# Patient Record
Sex: FEMALE | Employment: STUDENT | URBAN - METROPOLITAN AREA
[De-identification: names, ages, dates, MRNs, and addresses within clinical notes are randomized per-mention and may not be internally consistent; named-entity substitution may affect disease eponyms.]

---

## 2017-01-19 ENCOUNTER — OFFICE VISIT (OUTPATIENT)
Dept: AUDIOLOGY | Facility: CLINIC | Age: 22
End: 2017-01-19
Payer: COMMERCIAL

## 2017-01-19 ENCOUNTER — OFFICE VISIT (OUTPATIENT)
Dept: OTOLARYNGOLOGY | Facility: CLINIC | Age: 22
End: 2017-01-19
Payer: COMMERCIAL

## 2017-01-19 VITALS — HEART RATE: 96 BPM | BODY MASS INDEX: 39.3 KG/M2 | OXYGEN SATURATION: 99 % | WEIGHT: 246 LBS

## 2017-01-19 DIAGNOSIS — H90.3 ASYMMETRICAL SENSORINEURAL HEARING LOSS: ICD-10-CM

## 2017-01-19 DIAGNOSIS — H93.12 TINNITUS, LEFT: ICD-10-CM

## 2017-01-19 DIAGNOSIS — H93.A2 PULSATILE TINNITUS OF LEFT EAR: ICD-10-CM

## 2017-01-19 DIAGNOSIS — H93.8X2 SENSATION OF FULLNESS IN LEFT EAR: ICD-10-CM

## 2017-01-19 DIAGNOSIS — H90.12 CONDUCTIVE HEARING LOSS IN LEFT EAR: Primary | ICD-10-CM

## 2017-01-19 PROCEDURE — 99207 ZZC NO CHARGE LOS: CPT | Performed by: AUDIOLOGIST

## 2017-01-19 PROCEDURE — 99203 OFFICE O/P NEW LOW 30 MIN: CPT | Performed by: OTOLARYNGOLOGY

## 2017-01-19 PROCEDURE — 92557 COMPREHENSIVE HEARING TEST: CPT | Performed by: AUDIOLOGIST

## 2017-01-19 PROCEDURE — 92567 TYMPANOMETRY: CPT | Performed by: AUDIOLOGIST

## 2017-01-19 NOTE — PROGRESS NOTES
SUBJECTIVE:                                                    Dominique Shah is a 21 year old female who presents to clinic today for the following health issues:      Feeling of rushing sound in left ear x5 months.     It is a pulsatile sound c/w heart beat and she feels that hearing is down in the ear.  No vertigo.    Problem list and histories reviewed & adjusted, as indicated.  Additional history: as documented    Patient Active Problem List   Diagnosis     Allergic rhinitis     Dysmenorrhea     Menorrhagia     Abdominal pain, right upper quadrant     CARDIOVASCULAR SCREENING; LDL GOAL LESS THAN 160     Tinea versicolor     ASCUS with positive high risk HPV     Depression with anxiety     Major depression in complete remission (H)     Past Surgical History   Procedure Laterality Date     Esophagoscopy, gastroscopy, duodenoscopy (egd), combined  5/9/2013     Procedure: COMBINED ESOPHAGOSCOPY, GASTROSCOPY, DUODENOSCOPY (EGD), BIOPSY SINGLE OR MULTIPLE;  esophagoscopy, gastroscopy, duodenoscopy EGD  with  multiple biopsies;  Surgeon: Lawrence Friedman MD;  [facility]     Laparoscopic cholecystectomy  5/23/2013     Procedure: LAPAROSCOPIC CHOLECYSTECTOMY;  Laparoscopic Cholecystectomy;  Surgeon: Lawrence Friedman MD;  [facility]     Arthroscopy shoulder capsular repair  9/25/2013     Procedure: ARTHROSCOPY SHOULDER CAPSULAR REPAIR;  Right Shoulder Arthroscopy with  labral repair,   ;  Surgeon: Jay Jay Avila MD;  [facility]     Transposition ulnar nerve (elbow)  7/30/2014     Procedure: TRANSPOSITION ULNAR NERVE (ELBOW);  Surgeon: Jay Jay Avila MD;  [facility]       Social History   Substance Use Topics     Smoking status: Never Smoker      Smokeless tobacco: Never Used     Alcohol Use: Yes      Comment: pretty rare     Family History   Problem Relation Age of Onset     DIABETES Maternal Grandfather      AOD     HEART DISEASE Maternal Grandfather      MI      Obesity Maternal Grandfather      Unknown/Adopted No family hx of      Asthma No family hx of      Hypertension No family hx of      CEREBROVASCULAR DISEASE No family hx of      Breast Cancer No family hx of      Cancer - colorectal No family hx of      Prostate Cancer No family hx of      Alcohol/Drug No family hx of      Allergies No family hx of      Alzheimer Disease No family hx of      Anesthesia Reaction No family hx of      Arthritis No family hx of      Blood Disease No family hx of      CANCER No family hx of      Cardiovascular No family hx of      Circulatory No family hx of      Congenital Anomalies No family hx of      Connective Tissue Disorder No family hx of      Depression No family hx of      Eye Disorder No family hx of      Genetic Disorder No family hx of      GASTROINTESTINAL DISEASE No family hx of      Genitourinary Problems No family hx of      Gynecology No family hx of      Lipids No family hx of      Musculoskeletal Disorder No family hx of      Neurologic Disorder No family hx of      OSTEOPOROSIS No family hx of      Psychotic Disorder No family hx of      Respiratory No family hx of      Thyroid Disease No family hx of      Hearing Loss No family hx of          Current Outpatient Prescriptions   Medication Sig Dispense Refill     levonorgestrel-ethinyl estradiol (AVIANE) 0.1-20 MG-MCG per tablet Take 1 tablet by mouth daily 84 tablet 3     cyclobenzaprine (FLEXERIL) 10 MG tablet Take 1 tablet (10 mg) by mouth 3 times daily as needed for muscle spasms 30 tablet 1     ketoconazole (NIZORAL) 200 MG tablet TAKE ONE TABLET BY MOUTH EVERY DAY FOR 1 WEEK THEN TAKE 1 TABLET EITHER ONCE A WEEK OR ONCE A MONTH AS NEEDED 10 tablet 2     FLUoxetine (PROZAC) 10 MG capsule Take 2 capsules (20 mg) by mouth daily 60 capsule 5     EQ LORATADINE 10 MG tablet TAKE ONE TABLET BY MOUTH ONCE DAILY 90 tablet 0     IBUPROFEN PO Take 200 mg by mouth every 6 hours       Allergies   Allergen Reactions      Oxycodone Nausea and Vomiting     Vicodone has been going well.     Seasonal Allergies      Sulfa Drugs Rash     Problem list, Medication list, Allergies, and Medical/Social/Surgical histories reviewed in [facility] and updated as appropriate.    ROS:  Constitutional, HEENT, cardiovascular, pulmonary, gi and gu systems are negative, except as otherwise noted.    OBJECTIVE:                                                    Pulse 96  Wt 111.585 kg (246 lb)  SpO2 99%  Body mass index is 39.3 kg/(m^2).  GENERAL: healthy, alert and no distress  EYES: Eyes grossly normal to inspection, PERRL and conjunctivae and sclerae normal  HENT: ear canals and TM's normal, nose and mouth without ulcers or lesions  NECK: no adenopathy, no asymmetry, masses, or scars and thyroid normal to palpation  NEURO: Normal strength and tone, mentation intact and speech normal    Diagnostic Test Results:  Audio - normal right ear, mild low frequency SNHL left ear, normal tymps     ASSESSMENT/PLAN:                                                    Pulsatile tinnitus. Further eval required. Will get MRI of Brain and MRA    Follow up in 2 weeks    Rafi Rosado MD, MD  [facility]

## 2017-01-19 NOTE — MR AVS SNAPSHOT
After Visit Summary   1/19/2017    Dominique Shah    MRN: [id number]           Patient Information     Date Of Birth          1995        Visit Information        Provider Department      1/19/2017 11:00 AM Rafi Rosado MD [facility]        Today's Diagnoses     Sensation of plugged ear    -  1     Pulsatile tinnitus of left ear         Asymmetrical sensorineural hearing loss            Follow-ups after your visit        Additional Services     AUDIOLOGY ADULT REFERRAL                 Your next 10 appointments already scheduled     Feb 02, 2017 11:15 AM   Return Visit with Rafi Rosado MD   [facility] ([facility]    [address] [place] [id number]   [phone]              Future tests that were ordered for you today     Open Future Orders        Priority Expected Expires Ordered    MR Brain w/o & w Contrast Routine  1/19/2018 1/19/2017    MRA Head w/o Contrast Angiogram Routine  1/19/2018 1/19/2017            Who to contact     If you have questions or need follow up information about today's clinic visit or your schedule please contact [facility] directly at [phone].  Normal or non-critical lab and imaging results will be communicated to you by [employer], letter or phone within 4 business days after the clinic has received the results. If you do not hear from us within 7 days, please contact the clinic through [employer] or phone. If you have a critical or abnormal lab result, we will notify you by phone as soon as possible.  Submit refill requests through [employer] or call your pharmacy and they will forward the refill request to us. Please allow 3 business days for your refill to be completed.          Additional Information About Your Visit        MyChart Information     [employer] gives you secure access to your electronic health record. If you see a primary care provider, you can also send messages to  your care team and make appointments. If you have questions, please call your primary care clinic.  If you do not have a primary care provider, please call [phone] and they will assist you.        Care EveryWhere ID     This is your Care EveryWhere ID. This could be used by other organizations to access your [facility] medical records  [id number]        Your Vitals Were     Pulse Pulse Oximetry                96 99%           Blood Pressure from Last 3 Encounters:   12/15/16 122/66   11/02/16 126/66   12/10/15 110/68    Weight from Last 3 Encounters:   01/19/17 111.585 kg (246 lb)   12/15/16 110.224 kg (243 lb)   11/02/16 108.41 kg (239 lb)              We Performed the Following     AUDIOLOGY ADULT REFERRAL        Primary Care Provider Office Phone # Fax #    Fide Scruggs PA-C [phone]       [facility] [address] [place] [address]        Thank you!     Thank you for choosing [facility]  for your care. Our goal is always to provide you with excellent care. Hearing back from our patients is one way we can continue to improve our services. Please take a few minutes to complete the written survey that you may receive in the mail after your visit with us. Thank you!             Your Updated Medication List - Protect others around you: Learn how to safely use, store and throw away your medicines at www.disposemymeds.org.          This list is accurate as of: 1/19/17 12:42 PM.  Always use your most recent med list.                   Brand Name Dispense Instructions for use    cyclobenzaprine 10 MG tablet    FLEXERIL    30 tablet    Take 1 tablet (10 mg) by mouth 3 times daily as needed for muscle spasms       EQ LORATADINE 10 MG tablet   Generic drug:  loratadine     90 tablet    TAKE ONE TABLET BY MOUTH ONCE DAILY       FLUoxetine 10 MG capsule    PROzac    60 capsule    Take 2 capsules (20 mg) by mouth daily       IBUPROFEN PO      Take 200 mg by mouth  every 6 hours       ketoconazole 200 MG tablet    NIZORAL    10 tablet    TAKE ONE TABLET BY MOUTH EVERY DAY FOR 1 WEEK THEN TAKE 1 TABLET EITHER ONCE A WEEK OR ONCE A MONTH AS NEEDED       levonorgestrel-ethinyl estradiol 0.1-20 MG-MCG per tablet    AVIANE    84 tablet    Take 1 tablet by mouth daily

## 2017-01-19 NOTE — NURSING NOTE
"Chief Complaint   Patient presents with     Consult     referring Fide Scruggs PA-C     Ent Problem     Sensation of plugged ears.       Initial Pulse 96  Wt 111.585 kg (246 lb)  SpO2 99% Estimated body mass index is 39.3 kg/(m^2) as calculated from the following:    Height as of 11/2/16: 1.685 m (5' 6.34\").    Weight as of this encounter: 111.585 kg (246 lb).  BP completed using cuff size: NA (Not Taken)  "

## 2017-01-19 NOTE — PROGRESS NOTES
AUDIOLOGY REPORT    SUMMARY: Audiology visit completed. See audiogram for results.      RECOMMENDATIONS: Follow-up with ENT.    Rena Urbano  Audiologist  [place] License  #[id number]

## 2017-01-25 ENCOUNTER — HOSPITAL ENCOUNTER (OUTPATIENT)
Dept: MRI IMAGING | Facility: CLINIC | Age: 22
End: 2017-01-25
Attending: OTOLARYNGOLOGY
Payer: COMMERCIAL

## 2017-01-25 ENCOUNTER — HOSPITAL ENCOUNTER (OUTPATIENT)
Dept: MRI IMAGING | Facility: CLINIC | Age: 22
Discharge: HOME | End: 2017-01-25
Attending: OTOLARYNGOLOGY | Admitting: OTOLARYNGOLOGY
Payer: COMMERCIAL

## 2017-01-25 DIAGNOSIS — H90.3 ASYMMETRICAL SENSORINEURAL HEARING LOSS: ICD-10-CM

## 2017-01-25 DIAGNOSIS — H93.A2 PULSATILE TINNITUS OF LEFT EAR: ICD-10-CM

## 2017-01-25 PROCEDURE — A9585 GADOBUTROL INJECTION: HCPCS | Performed by: RADIOLOGY

## 2017-01-25 PROCEDURE — 25500064 ZZH RX 255 OP 636: Performed by: RADIOLOGY

## 2017-01-25 PROCEDURE — 70544 MR ANGIOGRAPHY HEAD W/O DYE: CPT

## 2017-01-25 PROCEDURE — 70553 MRI BRAIN STEM W/O & W/DYE: CPT

## 2017-01-25 RX ORDER — GADOBUTROL 604.72 MG/ML
10 INJECTION INTRAVENOUS ONCE
Status: COMPLETED | OUTPATIENT
Start: 2017-01-25 | End: 2017-01-25

## 2017-01-25 RX ADMIN — GADOBUTROL 10 ML: 604.72 INJECTION INTRAVENOUS at 15:49

## 2017-01-31 DIAGNOSIS — H93.A1 TINNITUS OF RIGHT EAR OF VASCULAR ORIGIN: Primary | ICD-10-CM

## 2017-01-31 RX ORDER — ALPRAZOLAM 0.25 MG
0.25 TABLET ORAL
Qty: 30 TABLET | Refills: 3 | Status: CANCELLED | OUTPATIENT
Start: 2017-01-31

## 2017-01-31 RX ORDER — ALPRAZOLAM 0.25 MG
0.25 TABLET ORAL
Qty: 30 TABLET | Refills: 3 | Status: SHIPPED | OUTPATIENT
Start: 2017-01-31 | End: 2017-03-02

## 2017-04-17 ENCOUNTER — MYC MEDICAL ADVICE (OUTPATIENT)
Dept: FAMILY MEDICINE | Facility: OTHER | Age: 22
End: 2017-04-17

## 2017-04-17 DIAGNOSIS — F32.5 MAJOR DEPRESSION IN COMPLETE REMISSION (H): ICD-10-CM

## 2017-04-18 RX ORDER — FLUOXETINE 10 MG/1
30 CAPSULE ORAL DAILY
Qty: 90 CAPSULE | Refills: 1 | Status: SHIPPED | OUTPATIENT
Start: 2017-04-18 | End: 2017-06-26

## 2017-04-20 NOTE — PROGRESS NOTES
SUBJECTIVE:     CC: Dominique Shah is an 21 year old woman who presents for preventive health visit.     Physical   Annual:     Getting at least 3 servings of Calcium per day::  Yes    Bi-annual eye exam::  Yes    Dental care twice a year::  Yes    Sleep apnea or symptoms of sleep apnea::  None    Diet::  Regular (no restrictions)    Frequency of exercise::  2-3 days/week    Duration of exercise::  15-30 minutes    Taking medications regularly::  Yes    Medication side effects::  None    Additional concerns today::  YES    Answers for HPI/ROS submitted by the patient on 4/21/2017   If you checked off any problems, how difficult have these problems made it for you to do your work, take care of things at home, or get along with other people?: Somewhat difficult  PHQ9 TOTAL SCORE: 6  JAME 7 TOTAL SCORE: 1  Q1: Little interest or pleasure in doing things: 1=Several days  Q2: Feeling down, depressed or hopeless: 1=Several days  PHQ-2 Score: 2    Patient needs titers for school- Hep B, Meningococcal, MMR, Tdap.  Patient would also like her Thyroid Checked today.    Today's PHQ-2 Score:   PHQ-2 ( 1999 [employer]) 12/15/2016   Q1: Little interest or pleasure in doing things 1   Q2: Feeling down, depressed or hopeless 1   PHQ-2 Score 2       Abuse: Current or Past(Physical, Sexual or Emotional)- No  Do you feel safe in your environment - Yes    Social History   Substance Use Topics     Smoking status: Never Smoker     Smokeless tobacco: Never Used     Alcohol use Yes      Comment: pretty rare     The patient does not drink >3 drinks per day nor >7 drinks per week.    Recent Labs   Lab Test  05/28/15   1202   CHOL  158   HDL  59   LDL  83   TRIG  78   CHOLHDLRATIO  2.7       Reviewed orders with patient.  Reviewed health maintenance and updated orders accordingly - Yes    Mammo Decision Support:  Mammogram not appropriate for this patient based on age.    Pertinent mammograms are reviewed under the imaging tab.  History of  abnormal Pap smear: NO - age 21-29 PAP every 3 years recommended    Reviewed and updated as needed this visit by clinical staff         Reviewed and updated as needed this visit by Provider          Past Medical History:   Diagnosis Date     ASCUS with positive high risk HPV 5/28/15    @ 19 y.o.     Infectious mononucleosis     Age 7     Rubella without mention of complication     Rubella      Past Surgical History:   Procedure Laterality Date     ARTHROSCOPY SHOULDER CAPSULAR REPAIR  9/25/2013    Procedure: ARTHROSCOPY SHOULDER CAPSULAR REPAIR;  Right Shoulder Arthroscopy with  labral repair,   ;  Surgeon: Jay Jay Avila MD;  [facility]     ESOPHAGOSCOPY, GASTROSCOPY, DUODENOSCOPY (EGD), COMBINED  5/9/2013    Procedure: COMBINED ESOPHAGOSCOPY, GASTROSCOPY, DUODENOSCOPY (EGD), BIOPSY SINGLE OR MULTIPLE;  esophagoscopy, gastroscopy, duodenoscopy EGD  with  multiple biopsies;  Surgeon: Lawrence Friedman MD;  [facility]     LAPAROSCOPIC CHOLECYSTECTOMY  5/23/2013    Procedure: LAPAROSCOPIC CHOLECYSTECTOMY;  Laparoscopic Cholecystectomy;  Surgeon: Lawrence Friedman MD;  [facility]     TRANSPOSITION ULNAR NERVE (ELBOW)  7/30/2014    Procedure: TRANSPOSITION ULNAR NERVE (ELBOW);  Surgeon: Jay Jay Avila MD;  [facility]       ROS:  C: NEGATIVE for fever, chills, change in weight  I: NEGATIVE for worrisome rashes, moles or lesions  E: NEGATIVE for vision changes or irritation  ENT: NEGATIVE for ear, mouth and throat problems  R: NEGATIVE for significant cough or SOB  B: NEGATIVE for masses, tenderness or discharge  CV: NEGATIVE for chest pain, palpitations or peripheral edema  GI: NEGATIVE for nausea, abdominal pain, heartburn, or change in bowel habits  : NEGATIVE for unusual urinary or vaginal symptoms. Periods are regular.  M: NEGATIVE for significant arthralgias or myalgia  N: NEGATIVE for weakness, dizziness or paresthesias  P: NEGATIVE for changes in mood or  affect    Problem list, Medication list, Allergies, and Medical/Social/Surgical histories reviewed in [facility] and updated as appropriate.  Labs reviewed in [facility]  BP Readings from Last 3 Encounters:   04/24/17 118/70   12/15/16 122/66   11/02/16 126/66    Wt Readings from Last 3 Encounters:   04/24/17 245 lb (111.1 kg)   01/19/17 246 lb (111.6 kg)   12/15/16 243 lb (110.2 kg)                  Patient Active Problem List   Diagnosis     Allergic rhinitis     Tinea versicolor     ASCUS with positive high risk HPV     Mild episode of recurrent major depressive disorder (H)     Encounter for surveillance of contraceptive pills     Past Surgical History:   Procedure Laterality Date     ARTHROSCOPY SHOULDER CAPSULAR REPAIR  9/25/2013    Procedure: ARTHROSCOPY SHOULDER CAPSULAR REPAIR;  Right Shoulder Arthroscopy with  labral repair,   ;  Surgeon: Jay Jay Avila MD;  [facility]     ESOPHAGOSCOPY, GASTROSCOPY, DUODENOSCOPY (EGD), COMBINED  5/9/2013    Procedure: COMBINED ESOPHAGOSCOPY, GASTROSCOPY, DUODENOSCOPY (EGD), BIOPSY SINGLE OR MULTIPLE;  esophagoscopy, gastroscopy, duodenoscopy EGD  with  multiple biopsies;  Surgeon: Lawrence Friedman MD;  [facility]     LAPAROSCOPIC CHOLECYSTECTOMY  5/23/2013    Procedure: LAPAROSCOPIC CHOLECYSTECTOMY;  Laparoscopic Cholecystectomy;  Surgeon: Lawrence Friedman MD;  [facility]     TRANSPOSITION ULNAR NERVE (ELBOW)  7/30/2014    Procedure: TRANSPOSITION ULNAR NERVE (ELBOW);  Surgeon: Jay Jay Avila MD;  [facility]       Social History   Substance Use Topics     Smoking status: Never Smoker     Smokeless tobacco: Never Used     Alcohol use Yes      Comment: pretty rare     Family History   Problem Relation Age of Onset     DIABETES Maternal Grandfather      AOD     HEART DISEASE Maternal Grandfather      MI     Obesity Maternal Grandfather      Unknown/Adopted No family hx of      Asthma No family hx of      Hypertension No family hx of       CEREBROVASCULAR DISEASE No family hx of      Breast Cancer No family hx of      Cancer - colorectal No family hx of      Prostate Cancer No family hx of      Alcohol/Drug No family hx of      Allergies No family hx of      Alzheimer Disease No family hx of      Anesthesia Reaction No family hx of      Arthritis No family hx of      Blood Disease No family hx of      CANCER No family hx of      Cardiovascular No family hx of      Circulatory No family hx of      Congenital Anomalies No family hx of      Connective Tissue Disorder No family hx of      Depression No family hx of      Eye Disorder No family hx of      Genetic Disorder No family hx of      GASTROINTESTINAL DISEASE No family hx of      Genitourinary Problems No family hx of      Gynecology No family hx of      Lipids No family hx of      Musculoskeletal Disorder No family hx of      Neurologic Disorder No family hx of      OSTEOPOROSIS No family hx of      Psychotic Disorder No family hx of      Respiratory No family hx of      Thyroid Disease No family hx of      Hearing Loss No family hx of          Current Outpatient Prescriptions   Medication Sig Dispense Refill     FLUoxetine (PROZAC) 10 MG capsule Take 3 capsules (30 mg) by mouth daily 90 capsule 1     levonorgestrel-ethinyl estradiol (AVIANE) 0.1-20 MG-MCG per tablet Take 1 tablet by mouth daily 84 tablet 3     ketoconazole (NIZORAL) 200 MG tablet TAKE ONE TABLET BY MOUTH EVERY DAY FOR 1 WEEK THEN TAKE 1 TABLET EITHER ONCE A WEEK OR ONCE A MONTH AS NEEDED 10 tablet 2     EQ LORATADINE 10 MG tablet TAKE ONE TABLET BY MOUTH ONCE DAILY 90 tablet 0     IBUPROFEN PO Take 200 mg by mouth every 6 hours Reported on 4/24/2017       Allergies   Allergen Reactions     Oxycodone Nausea and Vomiting     Vicodone has been going well.     Seasonal Allergies      Sulfa Drugs Rash     OBJECTIVE:     There were no vitals taken for this visit.  EXAM:  Physical Exam   Constitutional: She is oriented to person, place,  "and time. She appears well-developed and well-nourished.   HENT:   Head: Normocephalic and atraumatic.   Right Ear: External ear normal.   Left Ear: External ear normal.   Mouth/Throat: Oropharynx is clear and moist.   Eyes: EOM are normal.   Neck: Neck supple.   Cardiovascular: Normal rate and regular rhythm.    Pulmonary/Chest: Effort normal and breath sounds normal.   Abdominal: Soft. Bowel sounds are normal.   Musculoskeletal: Normal range of motion.   Neurological: She is alert and oriented to person, place, and time.   Psychiatric: She has a normal mood and affect.         ASSESSMENT/PLAN:       Problem List Items Addressed This Visit     Mild episode of recurrent major depressive disorder (H)     Slight worsening .  Recent increase in dose of prozac. Follow up per PCP         Encounter for surveillance of contraceptive pills     Has been on OCP since the age of 13. No side effects  Uptodate PAP. Can give refills as needed for the next yr         Non morbid obesity, unspecified obesity type    Relevant Orders    Glucose, whole blood    Lipid Profile with reflex to direct LDL    TSH with free T4 reflex      Other Visit Diagnoses     Encounter for routine adult health examination with abnormal findings    -  Primary    Relevant Orders    Varicella Zoster Virus Antibody IgG    Glucose, whole blood    Lipid Profile with reflex to direct LDL    Abnormal weight gain        Relevant Orders    TSH with free T4 reflex            COUNSELING:  Reviewed preventive health counseling, as reflected in patient instructions       Regular exercise       Healthy diet/nutrition       Vision screening       Hearing screening         reports that she has never smoked. She has never used smokeless tobacco.    Estimated body mass index is 39.3 kg/(m^2) as calculated from the following:    Height as of 11/2/16: 5' 6.34\" (1.685 m).    Weight as of 1/19/17: 246 lb (111.6 kg).   Weight management plan: Discussed healthy diet and exercise " guidelines and patient will follow up in 12 months in clinic to re-evaluate.    Counseling Resources:  ATP IV Guidelines  Pooled Cohorts Equation Calculator  Breast Cancer Risk Calculator  FRAX Risk Assessment  ICSI Preventive Guidelines  Dietary Guidelines for Americans, 2010  USDA's MyPlate  ASA Prophylaxis  Lung CA Screening    Christelle Salcedo MD  [facility]

## 2017-04-21 ASSESSMENT — PATIENT HEALTH QUESTIONNAIRE - PHQ9
10. IF YOU CHECKED OFF ANY PROBLEMS, HOW DIFFICULT HAVE THESE PROBLEMS MADE IT FOR YOU TO DO YOUR WORK, TAKE CARE OF THINGS AT HOME, OR GET ALONG WITH OTHER PEOPLE: SOMEWHAT DIFFICULT
SUM OF ALL RESPONSES TO PHQ QUESTIONS 1-9: 6

## 2017-04-21 ASSESSMENT — ANXIETY QUESTIONNAIRES
7. FEELING AFRAID AS IF SOMETHING AWFUL MIGHT HAPPEN: 0 = NOT AT ALL
GAD7 TOTAL SCORE: 1
GAD7 TOTAL SCORE: 1

## 2017-04-22 ASSESSMENT — ANXIETY QUESTIONNAIRES: GAD7 TOTAL SCORE: 1

## 2017-04-22 ASSESSMENT — PATIENT HEALTH QUESTIONNAIRE - PHQ9: SUM OF ALL RESPONSES TO PHQ QUESTIONS 1-9: 6

## 2017-04-24 ENCOUNTER — OFFICE VISIT (OUTPATIENT)
Dept: FAMILY MEDICINE | Facility: OTHER | Age: 22
End: 2017-04-24
Payer: COMMERCIAL

## 2017-04-24 VITALS
SYSTOLIC BLOOD PRESSURE: 118 MMHG | HEIGHT: 66 IN | DIASTOLIC BLOOD PRESSURE: 70 MMHG | WEIGHT: 245 LBS | OXYGEN SATURATION: 98 % | HEART RATE: 74 BPM | TEMPERATURE: 98.3 F | BODY MASS INDEX: 39.37 KG/M2 | RESPIRATION RATE: 16 BRPM

## 2017-04-24 DIAGNOSIS — Z00.01 ENCOUNTER FOR ROUTINE ADULT HEALTH EXAMINATION WITH ABNORMAL FINDINGS: Primary | ICD-10-CM

## 2017-04-24 DIAGNOSIS — Z30.41 ENCOUNTER FOR SURVEILLANCE OF CONTRACEPTIVE PILLS: ICD-10-CM

## 2017-04-24 DIAGNOSIS — F33.0 MILD EPISODE OF RECURRENT MAJOR DEPRESSIVE DISORDER (H): ICD-10-CM

## 2017-04-24 DIAGNOSIS — E66.9 NON MORBID OBESITY, UNSPECIFIED OBESITY TYPE: ICD-10-CM

## 2017-04-24 DIAGNOSIS — R63.5 ABNORMAL WEIGHT GAIN: ICD-10-CM

## 2017-04-24 PROCEDURE — 86787 VARICELLA-ZOSTER ANTIBODY: CPT | Performed by: FAMILY MEDICINE

## 2017-04-24 PROCEDURE — 99395 PREV VISIT EST AGE 18-39: CPT | Performed by: FAMILY MEDICINE

## 2017-04-24 PROCEDURE — 36415 COLL VENOUS BLD VENIPUNCTURE: CPT | Performed by: FAMILY MEDICINE

## 2017-04-24 ASSESSMENT — PAIN SCALES - GENERAL: PAINLEVEL: NO PAIN (0)

## 2017-04-24 NOTE — MR AVS SNAPSHOT
After Visit Summary   4/24/2017    Dominique Shah    MRN: [id number]           Patient Information     Date Of Birth          1995        Visit Information        Provider Department      4/24/2017 11:00 AM Christelle Salcedo MD [facility]        Today's Diagnoses     Encounter for routine adult health examination with abnormal findings    -  1    Mild episode of recurrent major depressive disorder (H)        Encounter for surveillance of contraceptive pills        Non morbid obesity, unspecified obesity type        Abnormal weight gain          Care Instructions      Preventive Health Recommendations  Female Ages 18 to 25     Yearly exam:     See your health care provider every year in order to  o Review health changes.   o Discuss preventive care.    o Review your medicines if your doctor has prescribed any.      You should be tested each year for STDs (sexually transmitted diseases).       After age 20, talk to your provider about how often you should have cholesterol testing.      Starting at age 21, get a Pap test every three years. If you have an abnormal result, your doctor may have you test more often.      If you are at risk for diabetes, you should have a diabetes test (fasting glucose).     Shots:     Get a flu shot each year.     Get a tetanus shot every 10 years.     Consider getting the shot (vaccine) that prevents cervical cancer (Gardasil).    Nutrition:     Eat at least 5 servings of fruits and vegetables each day.    Eat whole-grain bread, whole-wheat pasta and brown rice instead of white grains and rice.    Talk to your provider about Calcium and Vitamin D.     Lifestyle    Exercise at least 150 minutes a week each week (30 minutes a day, 5 days a week). This will help you control your weight and prevent disease.    Limit alcohol to one drink per day.    No smoking.     Wear sunscreen to prevent skin cancer.    See your dentist every six months for an exam and  "cleaning.        Follow-ups after your visit        Follow-up notes from your care team     Return in about 1 year (around 4/24/2018).      Future tests that were ordered for you today     Open Future Orders        Priority Expected Expires Ordered    TSH with free T4 reflex Routine  4/24/2018 4/24/2017    Glucose, whole blood Routine  4/24/2018 4/24/2017    Lipid Profile with reflex to direct LDL Routine  4/24/2018 4/24/2017            Who to contact     If you have questions or need follow up information about today's clinic visit or your schedule please contact [facility] directly at [phone].  Normal or non-critical lab and imaging results will be communicated to you by MyChart, letter or phone within 4 business days after the clinic has received the results. If you do not hear from us within 7 days, please contact the clinic through Perfect Memory[employer] or phone. If you have a critical or abnormal lab result, we will notify you by phone as soon as possible.  Submit refill requests through Users[employer] or call your pharmacy and they will forward the refill request to us. Please allow 3 business days for your refill to be completed.          Additional Information About Your Visit        MyChart Information     Users[employer] gives you secure access to your electronic health record. If you see a primary care provider, you can also send messages to your care team and make appointments. If you have questions, please call your primary care clinic.  If you do not have a primary care provider, please call [phone] and they will assist you.        Care EveryWhere ID     This is your Care EveryWhere ID. This could be used by other organizations to access your [place] medical records  [id number]        Your Vitals Were     Pulse Temperature Respirations Height Pulse Oximetry BMI (Body Mass Index)    74 98.3  F (36.8  C) (Oral) 16 5' 6.46\" (1.688 m) 98% 39 kg/m2       Blood Pressure from Last 3 Encounters:   04/24/17 " 118/70   12/15/16 122/66   11/02/16 126/66    Weight from Last 3 Encounters:   04/24/17 245 lb (111.1 kg)   01/19/17 246 lb (111.6 kg)   12/15/16 243 lb (110.2 kg)              We Performed the Following     Varicella Zoster Virus Antibody IgG          Today's Medication Changes          These changes are accurate as of: 4/24/17 11:56 AM.  If you have any questions, ask your nurse or doctor.               Stop taking these medicines if you haven't already. Please contact your care team if you have questions.     cyclobenzaprine 10 MG tablet   Commonly known as:  FLEXERIL   Stopped by:  Christelle Salcedo MD                    Primary Care Provider Office Phone # Fax #    Fide Scruggs PA-C [phone]       [facility] [address] [place] [address]        Thank you!     Thank you for choosing [facility]  for your care. Our goal is always to provide you with excellent care. Hearing back from our patients is one way we can continue to improve our services. Please take a few minutes to complete the written survey that you may receive in the mail after your visit with us. Thank you!             Your Updated Medication List - Protect others around you: Learn how to safely use, store and throw away your medicines at www.disposemymeds.org.          This list is accurate as of: 4/24/17 11:56 AM.  Always use your most recent med list.                   Brand Name Dispense Instructions for use    EQ LORATADINE 10 MG tablet   Generic drug:  loratadine     90 tablet    TAKE ONE TABLET BY MOUTH ONCE DAILY       FLUoxetine 10 MG capsule    PROzac    90 capsule    Take 3 capsules (30 mg) by mouth daily       IBUPROFEN PO      Take 200 mg by mouth every 6 hours Reported on 4/24/2017       ketoconazole 200 MG tablet    NIZORAL    10 tablet    TAKE ONE TABLET BY MOUTH EVERY DAY FOR 1 WEEK THEN TAKE 1 TABLET EITHER ONCE A WEEK OR ONCE A MONTH AS NEEDED       levonorgestrel-ethinyl  estradiol 0.1-20 MG-MCG per tablet    AVIANE    84 tablet    Take 1 tablet by mouth daily

## 2017-04-24 NOTE — ASSESSMENT & PLAN NOTE
Has been on OCP since the age of 13. No side effects  Uptodate PAP. Can give refills as needed for the next yr

## 2017-04-24 NOTE — NURSING NOTE
"Chief Complaint   Patient presents with     Physical     Panel Management     phq9/jorge, height       Initial /70 (BP Location: Right arm, Patient Position: Chair, Cuff Size: Adult Large)  Pulse 74  Temp 98.3  F (36.8  C) (Oral)  Resp 16  Ht 5' 6.46\" (1.688 m)  Wt 245 lb (111.1 kg)  SpO2 98%  BMI 39 kg/m2 Estimated body mass index is 39 kg/(m^2) as calculated from the following:    Height as of this encounter: 5' 6.46\" (1.688 m).    Weight as of this encounter: 245 lb (111.1 kg).  Medication Reconciliation: complete   Steffany Solomon CMA (AAMA)    "

## 2017-04-25 LAB — VZV IGG SER QL IA: 3.4 AI (ref 0–0.8)

## 2017-04-25 NOTE — PROGRESS NOTES
Ms. Shah    Your recent test results are attached.     Peripheral show signs of previous infection and immunity against chickenpox virus.    If you have any questions or concerns please contact me via My Chart or call the clinic at [phone]     Thank You  Christelle Salcedo MD.

## 2017-06-07 ENCOUNTER — PRE VISIT (OUTPATIENT)
Dept: OTOLARYNGOLOGY | Facility: CLINIC | Age: 22
End: 2017-06-07

## 2017-06-07 NOTE — TELEPHONE ENCOUNTER
1.  Date/reason for appt:  6/13/17   Left Ear    2.  Referring provider:  Dr Rosado    3.  Call to patient (Yes / No - short description):  No referred.  Records reviewed.  All records are in [facility] and imaging is in PACS.

## 2017-06-13 ENCOUNTER — OFFICE VISIT (OUTPATIENT)
Dept: OTOLARYNGOLOGY | Facility: CLINIC | Age: 22
End: 2017-06-13

## 2017-06-13 ENCOUNTER — OFFICE VISIT (OUTPATIENT)
Dept: AUDIOLOGY | Facility: CLINIC | Age: 22
End: 2017-06-13
Attending: OTOLARYNGOLOGY

## 2017-06-13 VITALS — BODY MASS INDEX: 37.93 KG/M2 | WEIGHT: 236 LBS | HEIGHT: 66 IN

## 2017-06-13 DIAGNOSIS — H93.A2 PULSATILE TINNITUS, LEFT EAR: Primary | ICD-10-CM

## 2017-06-13 DIAGNOSIS — H90.12 CONDUCTIVE HEARING LOSS IN LEFT EAR: Primary | ICD-10-CM

## 2017-06-13 DIAGNOSIS — G43.009 NONINTRACTABLE MIGRAINE, UNSPECIFIED MIGRAINE TYPE: ICD-10-CM

## 2017-06-13 DIAGNOSIS — H90.12 CONDUCTIVE HEARING LOSS OF LEFT EAR WITH UNRESTRICTED HEARING OF RIGHT EAR: ICD-10-CM

## 2017-06-13 ASSESSMENT — PAIN SCALES - GENERAL: PAINLEVEL: MILD PAIN (3)

## 2017-06-13 NOTE — PATIENT INSTRUCTIONS
1.  Patient to schedule & obtain CT Temporal bone scan.   -Will call patient with results once they become available.  2.  Patient referred to see Lon Rodriguez (neuruology) for migraines.  3.  Migraine packet given at today's visit as well.  4.  Patient to call the office with additional questions or concerns: [phone], option #3.

## 2017-06-13 NOTE — PROGRESS NOTES
AUDIOLOGY REPORT    SUMMARY: Audiology visit completed. See audiogram for results.      RECOMMENDATIONS: Follow-up with ENT.      Basia Goemz, CCC-A  Licensed Audiologist  [place] #[id number]

## 2017-06-13 NOTE — PROGRESS NOTES
"Dear Dr. Rosado,    I had the pleasure of meeting Dominique Espinozacrystal in consultation today at the [facility] at your request.    HISTORY OF PRESENT ILLNESS: She is a pleasant 21-year-old female who presents for evaluation of left-sided pulsatile tinnitus. She first noted the pulsatile sound in January 2016. It arose spontaneously and has been constant in nature since. It is a high pitch \"wooshing\" noise that correlates with her heartbeat. She noticed an increase in intensity in the subsequent months. She was evaluated in January 2017 where an MRI/MRA were obtained and ruled out an obvious cause for her symptoms. She was trialed on Xanax for three months and notes the severity decreased, but overall persisted. She has since discontinued. She has noted hearing loss associated with this and has had difficulty hearing conversational speech in loud noise situations. She denies vertigo and balance disturbance. She notes frequent headaches that are occipital and bitemporal in nature that are associated with photophobia. She has never been diagnosed with or treated for migraine headaches in the past. She states the intensity decreases with a left head turn. She does not have a history of otologic surgery or trauma.    PAST MEDICAL HISTORY: Seasonal allergies    PAST SURGICAL HISTORY:   1. Shoulder operation  2. Elbow operation  3. Cholecystectomy    MEDICATIONS:  1. Claritin  2. Fluoxetin  3. Birth control  4. Ibuprofen PRN    ALLERGES:   1. Oxycodone  2. Sulfa drugs    SOCIAL HISTORY: she lives in [place], [place]. 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 Does not drink caffeine. Nonsmoker. Drinks alcohol very rarely.    FAMILY HISTORY: Mother with hearing loss, thought secondary to otosclerosis.     REVIEW OF SYSTEMS: 10 point review of systems was obtained and is positive as per the HPI, it is otherwise negative.    PHYSICIAL EXAMINATION:  Constitutional: The patient was " well-groomed and in no acute distress.   Skin: Warm and pink.  Psychiatric: The patient's affect was calm, cooperative, and appropriate.   Respiratory: Breathing comfortably without stridor or exertion of accessory muscles.  Eyes: Pupils were equal and reactive. Extraocular movement intact.   Head: Normocephalic and atraumatic. No lesions or scars.  Ears: Auricles are unremarkable. Both ears examined under the microscope. The ear canals are lined with healthy skin. The tympanic membranes appear healthy with normal landmarks. The long process of incus and stapes are seen through the TM. There is no evidence of Schwartze sign. Baumann is midline. Rinne is air > bone bilateral. No bruit appreciated on auscultation. Compression of jugular vein decreased symptom.   Nose: Sinuses were nontender. Anterior rhinoscopy revealed midline septum and absence of purulence or polyps.  Oral Cavity: Normal tongue, floor of moth, buccal mucosa, and palate. No lesions or masses on inspection. No abnormal lymph tissue in the oropharynx.   Neck: The parotid is soft without masses. Supple with normal laryngeal and tracheal landmarks.   Lymphatic: There is no palpable lymphadenopathy or other masses in the neck.   Neurologic: Alert and oriented x 3. Cranial nerves III-XI within normal limits. Voice quality normal.   Otoneurologic: No spontaneous or gaze evoked nystagmus.  Cerebellar Function Tests:  Grossly normal    AUDIOGRAM: reviewed from today. Right normal pure tone thresholds. SRT is 10 dB and WRS is 100% at 55 dB. Left mild rising to normal conductive hearing loss with SRT of 15 dB and WRS of 100% at 60 dB. Tympanograms are type A. Reflexes are present and normal. Edna is negative.    IMAGING: MRI/MRA is reviewed. No lesions in the CPA. No obvious vascular abnormalities.    IMPRESSION AND PLAN: Dominique Shah is a 21-year-old female with unilateral pulsatile tinnitus and low frequency conductive hearing loss. Her mother has what  sounds like otosclerosis. We discussed the negative findings on imaging with the patient today. Her symptoms improved with compression of the jugular vein suggesting a venous hum. The differential for her hearing loss at this time includes early otosclerosis, superior semicircular canal dehiscence, and enlarged vestibular aqueduct, and jugular vein abnormality. We discussed that a temporal bone CT scan could help us evaluate further and she is agreeable. Regardless, she should follow up yearly for audiograms given family history of otosclerosis. We discussed options for managing tinnitus including masking and tinnitus retraining therapy. She was also amenable to a referral to Lon Rodriguez CNP to discuss treatment for migraine headache. This is not causing her tinnitus, but can certainly exacerbate the symptoms. We will call her with the results of the CT scan.     Thank you very much for the opportunity to participate in the care of your patient.    Pranav Nation MD  Resident Physician  Otolaryngology - Head & Neck Surgery     I, Erendira Crouch, saw this patient with the resident/fellow and agree with the resident s findings and plan of care as documented in the resident s/fellow s note. I was present for the entire procedure.

## 2017-06-13 NOTE — LETTER
"6/13/2017       RE: Dominique Shah  [address]  [facility] [address]     Dear Colleague,    Thank you for referring your patient, Dominique Shah, to the [facility] EAR NOSE AND THROAT at [facility]. Please see a copy of my visit note below.    Dear Dr. Rosado,    I had the pleasure of meeting Dominique Shah in consultation today at the [facility] at your request.    HISTORY OF PRESENT ILLNESS: She is a pleasant 21-year-old female who presents for evaluation of left-sided pulsatile tinnitus. She first noted the pulsatile sound in January 2016. It arose spontaneously and has been constant in nature since. It is a high pitch \"wooshing\" noise that correlates with her heartbeat. She noticed an increase in intensity in the subsequent months. She was evaluated in January 2017 where an MRI/MRA were obtained and ruled out an obvious cause for her symptoms. She was trialed on Xanax for three months and notes the severity decreased, but overall persisted. She has since discontinued. She has noted hearing loss associated with this and has had difficulty hearing conversational speech in loud noise situations. She denies vertigo and balance disturbance. She notes frequent headaches that are occipital and bitemporal in nature that are associated with photophobia. She has never been diagnosed with or treated for migraine headaches in the past. She states the intensity decreases with a left head turn. She does not have a history of otologic surgery or trauma.    PAST MEDICAL HISTORY: Seasonal allergies    PAST SURGICAL HISTORY:   1. Shoulder operation  2. Elbow operation  3. Cholecystectomy    MEDICATIONS:  1. Claritin  2. Fluoxetin  3. Birth control  4. Ibuprofen PRN    ALLERGES:   1. Oxycodone  2. Sulfa drugs    SOCIAL HISTORY: she lives in [place], [place]. 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 Does not drink caffeine. " Nonsmoker. Drinks alcohol very rarely.    FAMILY HISTORY: Mother with hearing loss, thought secondary to otosclerosis.     REVIEW OF SYSTEMS: 10 point review of systems was obtained and is positive as per the HPI, it is otherwise negative.    PHYSICIAL EXAMINATION:  Constitutional: The patient was well-groomed and in no acute distress.   Skin: Warm and pink.  Psychiatric: The patient's affect was calm, cooperative, and appropriate.   Respiratory: Breathing comfortably without stridor or exertion of accessory muscles.  Eyes: Pupils were equal and reactive. Extraocular movement intact.   Head: Normocephalic and atraumatic. No lesions or scars.  Ears: Auricles are unremarkable. Both ears examined under the microscope. The ear canals are lined with healthy skin. The tympanic membranes appear healthy with normal landmarks. The long process of incus and stapes are seen through the TM. There is no evidence of Schwartze sign. Baumann is midline. Rinne is air > bone bilateral. No bruit appreciated on auscultation. Compression of jugular vein decreased symptom.   Nose: Sinuses were nontender. Anterior rhinoscopy revealed midline septum and absence of purulence or polyps.  Oral Cavity: Normal tongue, floor of moth, buccal mucosa, and palate. No lesions or masses on inspection. No abnormal lymph tissue in the oropharynx.   Neck: The parotid is soft without masses. Supple with normal laryngeal and tracheal landmarks.   Lymphatic: There is no palpable lymphadenopathy or other masses in the neck.   Neurologic: Alert and oriented x 3. Cranial nerves III-XI within normal limits. Voice quality normal.   Otoneurologic: No spontaneous or gaze evoked nystagmus.  Cerebellar Function Tests:  Grossly normal    AUDIOGRAM: reviewed from today. Right normal pure tone thresholds. SRT is 10 dB and WRS is 100% at 55 dB. Left mild rising to normal conductive hearing loss with SRT of 15 dB and WRS of 100% at 60 dB. Tympanograms are type A. Reflexes  are present and normal. Edna is negative.    IMAGING: MRI/MRA is reviewed. No lesions in the CPA. No obvious vascular abnormalities.    IMPRESSION AND PLAN: Dominique Shah is a 21-year-old female with unilateral pulsatile tinnitus and low frequency conductive hearing loss. Her mother has what sounds like otosclerosis. We discussed the negative findings on imaging with the patient today. Her symptoms improved with compression of the jugular vein suggesting a venous hum. The differential for her hearing loss at this time includes early otosclerosis, superior semicircular canal dehiscence, and enlarged vestibular aqueduct, and jugular vein abnormality. We discussed that a temporal bone CT scan could help us evaluate further and she is agreeable. Regardless, she should follow up yearly for audiograms given family history of otosclerosis. We discussed options for managing tinnitus including masking and tinnitus retraining therapy. She was also amenable to a referral to Lon Rodriguez CNP to discuss treatment for migraine headache. This is not causing her tinnitus, but can certainly exacerbate the symptoms. We will call her with the results of the CT scan.     Thank you very much for the opportunity to participate in the care of your patient.    Pranav Nation MD  Resident Physician  Otolaryngology - Head & Neck Surgery     I, Erendira Crouch, saw this patient with the resident/fellow and agree with the resident s findings and plan of care as documented in the resident s/fellow s note. I was present for the entire procedure.      Again, thank you for allowing me to participate in the care of your patient.      Sincerely,    Erendira Crouch MD

## 2017-06-13 NOTE — MR AVS SNAPSHOT
After Visit Summary   6/13/2017    Dominique Shah    MRN: [id number]           Patient Information     Date Of Birth          1995        Visit Information        Provider Department      6/13/2017 8:00 AM Xiomy Serna [facility] Audiology        Today's Diagnoses     Conductive hearing loss in left ear    -  1       Follow-ups after your visit        Your next 10 appointments already scheduled     Jun 13, 2017  9:10 AM CDT   (Arrive by 8:55 AM)   New Patient Visit with MD TRACE Julian [facility] Ear Nose and Throat ([facility])    [address] [place] [id number] [phone]              Who to contact     Please call your clinic at [phone] to:    Ask questions about your health    Make or cancel appointments    Discuss your medicines    Learn about your test results    Speak to your doctor   If you have compliments or concerns about an experience at your clinic, or if you wish to file a complaint, please contact [facility] Patient Relations at [phone] or email us at [email or web address]         Additional Information About Your Visit        MyChart Information     [employer] gives you secure access to your electronic health record. If you see a primary care provider, you can also send messages to your care team and make appointments. If you have questions, please call your primary care clinic.  If you do not have a primary care provider, please call [phone] and they will assist you.      [employer] is an electronic gateway that provides easy, online access to your medical records. With [employer], you can request a clinic appointment, read your test results, renew a prescription or communicate with your care team.     To access your existing account, please contact your [facility] Clinic or call [phone] for assistance.        Care EveryWhere ID      This is your Care EveryWhere ID. This could be used by other organizations to access your [place] medical records  [id number]         Blood Pressure from Last 3 Encounters:   04/24/17 118/70   12/15/16 122/66   11/02/16 126/66    Weight from Last 3 Encounters:   04/24/17 111.1 kg (245 lb)   01/19/17 111.6 kg (246 lb)   12/15/16 110.2 kg (243 lb)              We Performed the Following     AUDIOGRAM/TYMPANOGRAM - INTERFACE     [facility] Audiometry Thrshld Eval & Speech Recog ([id number])     Edna   ([id number])     Tymps / Reflex   ([id number])        Primary Care Provider Office Phone # Fax #    Fide Scruggs PA-C [phone]       [facility] [address] [place] [address]        Thank you!     Thank you for choosing [facility] AUDIOLOGY  for your care. Our goal is always to provide you with excellent care. Hearing back from our patients is one way we can continue to improve our services. Please take a few minutes to complete the written survey that you may receive in the mail after your visit with us. Thank you!             Your Updated Medication List - Protect others around you: Learn how to safely use, store and throw away your medicines at www.disposemymeds.org.          This list is accurate as of: 6/13/17  8:46 AM.  Always use your most recent med list.                   Brand Name Dispense Instructions for use    EQ LORATADINE 10 MG tablet   Generic drug:  loratadine     90 tablet    TAKE ONE TABLET BY MOUTH ONCE DAILY       FLUoxetine 10 MG capsule    PROzac    90 capsule    Take 3 capsules (30 mg) by mouth daily       IBUPROFEN PO      Take 200 mg by mouth every 6 hours Reported on 4/24/2017       ketoconazole 200 MG tablet    NIZORAL    10 tablet    TAKE ONE TABLET BY MOUTH EVERY DAY FOR 1 WEEK THEN TAKE 1 TABLET EITHER ONCE A WEEK OR ONCE A MONTH AS NEEDED       levonorgestrel-ethinyl estradiol 0.1-20 MG-MCG per tablet    AVIANE    84 tablet    Take 1 tablet by mouth daily

## 2017-06-13 NOTE — MR AVS SNAPSHOT
After Visit Summary   6/13/2017    Dominique Shah    MRN: [id number]           Patient Information     Date Of Birth          1995        Visit Information        Provider Department      6/13/2017 9:10 AM Erendira Crouch MD [facility]        Today's Diagnoses     Pulsatile tinnitus, left ear    -  1    Conductive hearing loss in left ear        Migraine          Care Instructions    1.  Patient to schedule & obtain CT Temporal bone scan.   -Will call patient with results once they become available.  2.  Patient referred to see Lon Rodriguez (neuruology) for migraines.  3.  Migraine packet given at today's visit as well.  4.  Patient to call the office with additional questions or concerns: [phone], option #3.           Follow-ups after your visit        Additional Services     MISCELLANEOUS REFERRAL       Neurology Referral.  Patient to see Lon Rodriguez NP.  Reason for visit: migraines.  Please evaluate & treat.                  Your next 10 appointments already scheduled     Jun 13, 2017  4:20 PM CDT   CT TEMPORAL ORBITAL SELLA W/O CONTRAST with UCCT1   [facility] ([facility])    [address] [address] [phone]           Please bring any scans or X-rays taken at other hospitals, if similar tests were done. Also bring a list of your medicines, including vitamins, minerals and over-the-counter drugs. It is safest to leave personal items at home.  Be sure to tell your doctor:   If you have any allergies.   If there s any chance you are pregnant.   If you are breastfeeding.   If you have any special needs.  You do not need to do anything special to prepare.  Please wear loose clothing, such as a sweat suit or jogging clothes. Avoid snaps, zippers and other metal. We may ask you to undress and put on a hospital gown.            Jul 18, 2017 10:30 AM CDT   (Arrive by 10:15 AM)  "  New Patient Visit with CARLENE Chapman [facility] ([facility])    [address]  3rd Floor  [facility] [address] [phone]              Future tests that were ordered for you today     Open Future Orders        Priority Expected Expires Ordered    CT Temporal orbital sella w/o contrast Routine  6/13/2018 6/13/2017            Who to contact     Please call your clinic at [phone] to:    Ask questions about your health    Make or cancel appointments    Discuss your medicines    Learn about your test results    Speak to your doctor   If you have compliments or concerns about an experience at your clinic, or if you wish to file a complaint, please contact [facility] Patient Relations at [phone] or email us at [email or web address]         Additional Information About Your Visit        [employer] Information     [employer] gives you secure access to your electronic health record. If you see a primary care provider, you can also send messages to your care team and make appointments. If you have questions, please call your primary care clinic.  If you do not have a primary care provider, please call [phone] and they will assist you.      [employer] is an electronic gateway that provides easy, online access to your medical records. With [employer], you can request a clinic appointment, read your test results, renew a prescription or communicate with your care team.     To access your existing account, please contact your [facility] or call [phone] for assistance.        Care EveryWhere ID     This is your Care EveryWhere ID. This could be used by other organizations to access your [facility] medical records  [id number]        Your Vitals Were     Height BMI (Body Mass Index)                1.676 m (5' 6\") 38.09 kg/m2           Blood Pressure from Last 3 Encounters: "   04/24/17 118/70   12/15/16 122/66   11/02/16 126/66    Weight from Last 3 Encounters:   06/13/17 107 kg (236 lb)   04/24/17 111.1 kg (245 lb)   01/19/17 111.6 kg (246 lb)              We Performed the Following     MISCELLANEOUS REFERRAL        Primary Care Provider Office Phone # Fax #    Fide Scruggs PA-C [phone]       [facility] [address] [place] [address]        Thank you!     Thank you for choosing [facility]  for your care. Our goal is always to provide you with excellent care. Hearing back from our patients is one way we can continue to improve our services. Please take a few minutes to complete the written survey that you may receive in the mail after your visit with us. Thank you!             Your Updated Medication List - Protect others around you: Learn how to safely use, store and throw away your medicines at www.disposemymeds.org.          This list is accurate as of: 6/13/17 10:33 AM.  Always use your most recent med list.                   Brand Name Dispense Instructions for use    EQ LORATADINE 10 MG tablet   Generic drug:  loratadine     90 tablet    TAKE ONE TABLET BY MOUTH ONCE DAILY       FLUoxetine 10 MG capsule    PROzac    90 capsule    Take 3 capsules (30 mg) by mouth daily       IBUPROFEN PO      Take 200 mg by mouth every 6 hours Reported on 4/24/2017       ketoconazole 200 MG tablet    NIZORAL    10 tablet    TAKE ONE TABLET BY MOUTH EVERY DAY FOR 1 WEEK THEN TAKE 1 TABLET EITHER ONCE A WEEK OR ONCE A MONTH AS NEEDED       levonorgestrel-ethinyl estradiol 0.1-20 MG-MCG per tablet    AVIANE    84 tablet    Take 1 tablet by mouth daily

## 2017-06-13 NOTE — NURSING NOTE
Chief Complaint   Patient presents with     Consult     left ear hearing     Adalgisa Gold Medical Assistant

## 2017-06-19 ENCOUNTER — CARE COORDINATION (OUTPATIENT)
Dept: OTOLARYNGOLOGY | Facility: CLINIC | Age: 22
End: 2017-06-19

## 2017-06-19 NOTE — PROGRESS NOTES
Dominique Shah   8-3-17 spoke with pt she will rtc for discussion of CT. Pt will also schedule appt with audiology for tinnitus eval/masking device.---   Steffany Cortez RN  ENT Care Coordinator   Otology  [phone]  8/3/2017 3:48 PM  6-19-17 the below message left on pt voice mail---Steffany Cortez RN  ENT Care Coordinator   Otology  [phone]    Message  Received: 2 days ago       Erendira Crouch MD Bergh, Sara J RN                     Please call with results. We did not find an inner ear abnormality that explains the pulsatile tinnitus.  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  It works for some people and not for others, and there are some risks that we would need to weigh very carefully.  I would be happy to see her back in clinic to discuss this further at her discretion.  Please let me know if she decides that she would like to do that.       Erendira Crouch MD              CT Temporal orbital sella w/o contrast   Status:  Final result   Visible to patient:  No (Not Released) Dx:  Conductive hearing loss in left ear; ... Order: [id number]       Notes Recorded by Erendira Crouch MD on 6/17/2017 at 12:01 PM  Please call with results. We did not find an inner ear abnormality that explains the pulsatile tinnitus.  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  It works for some people and not for others, and there are some risks that we would need to weigh very carefully.  I would be happy to see her back in clinic to discuss this further at her discretion.  Please let me know if she  decides that she would like to do that.     Erendira Crouch MD       Details        Reading Physician Reading Date Result Priority       Amos Bell MD Gencturk, Mehmet, MD 6/13/2017 6/13/2017            Narrative             CT TEMPORAL ORBITAL SELLA W/O CONTRAST 6/13/2017 11:01 AM    Provided History: Pulsatile tinnitus, left ear, Conductive hearing  loss, unilateral, left ear, with unrestricted hearing on the  contralateral side     Comparison: Brain MRI 1/25/2017     Technique: Using multidetector thin collimation helical acquisition  technique, axial and coronal thin section CT images were reconstructed  through both temporal bones. Additional reconstructions performed in  the planes of Poschl and Stenver were also obtained. Images were  reviewed in a bone window.    Findings:   Right temporal bone: The mastoid air cells are clear. There is no  debris in the external auditory canal. The tympanic membrane is  intact. There is no fluid or soft tissue thickening in the right  middle ear cavity. The bony ossicles are intact and in normal  alignment. The epitympanum is clear. The bony scutum is sharp. The  internal auditory canal and the labyrinthine structures are within  normal limits. The facial nerve canal appears normal along its course.    Left temporal bone: The mastoid air cells are clear. There is no  debris in the external auditory canal. The tympanic membrane is  intact. There is no fluid or soft tissue thickening in the left middle  ear cavity. The bony ossicles are intact and in normal alignment. The  epitympanum is clear. The bony scutum is sharp. The internal auditory  canal and the labyrinthine structures are within normal limits. The  facial nerve canal appears normal along its course.             Impression             Impression:  Normal CT study of the temporal bones.    I have personally reviewed the examination and initial interpretation  and I agree with the  findings.    PEBBLES DO MD               Last Resulted: 06/13/17 11:33 AM

## 2017-06-26 DIAGNOSIS — F32.5 MAJOR DEPRESSION IN COMPLETE REMISSION (H): ICD-10-CM

## 2017-06-26 NOTE — TELEPHONE ENCOUNTER
Prozac     Last Written Prescription Date: 4/18/2017  Last Fill Quantity: 90, # refills: 1  Last Office Visit with G primary care provider:  4/24/2017        Last PHQ-9 score on record=   PHQ-9 SCORE 4/21/2017   Total Score MyChart 6 (Mild depression)   Total Score -

## 2017-06-27 RX ORDER — FLUOXETINE 10 MG/1
CAPSULE ORAL
Qty: 90 CAPSULE | Refills: 5 | Status: SHIPPED | OUTPATIENT
Start: 2017-06-27 | End: 2018-03-22

## 2017-06-27 NOTE — TELEPHONE ENCOUNTER
Prescription approved per [facility] Refill Protocol.  Kelvin Monterroso, RN, BSN

## 2017-07-05 ASSESSMENT — ENCOUNTER SYMPTOMS
EYE REDNESS: 0
HYPERTENSION: 0
SORE THROAT: 0
HYPOTENSION: 0
PALPITATIONS: 1
EYE IRRITATION: 0
CLAUDICATION: 0
SINUS PAIN: 0
TROUBLE SWALLOWING: 0
EYE WATERING: 0
LEG SWELLING: 0
SINUS CONGESTION: 0
EYE PAIN: 0
NECK MASS: 0
SMELL DISTURBANCE: 0
EXERCISE INTOLERANCE: 0
TASTE DISTURBANCE: 0
DOUBLE VISION: 0
SLEEP DISTURBANCES DUE TO BREATHING: 0
ORTHOPNEA: 0
LIGHT-HEADEDNESS: 0
LEG PAIN: 0
TACHYCARDIA: 1
HOARSE VOICE: 0
SYNCOPE: 0

## 2017-07-06 ENCOUNTER — PRE VISIT (OUTPATIENT)
Dept: NEUROLOGY | Facility: CLINIC | Age: 22
End: 2017-07-06

## 2017-07-06 NOTE — TELEPHONE ENCOUNTER
1.  Date/reason for appt:7/18/17, Migraines   2.  Referring provider:STEPHANIE SULLIVAN  3.  Call to patient (Yes / No - short description):No, referred   4.  Previous care at / records requested from:   [facility]  5.  Other:

## 2017-07-18 ENCOUNTER — OFFICE VISIT (OUTPATIENT)
Dept: NEUROLOGY | Facility: CLINIC | Age: 22
End: 2017-07-18

## 2017-07-18 VITALS — DIASTOLIC BLOOD PRESSURE: 74 MMHG | SYSTOLIC BLOOD PRESSURE: 118 MMHG | HEIGHT: 66 IN | HEART RATE: 62 BPM

## 2017-07-18 DIAGNOSIS — G43.009 MIGRAINE WITHOUT AURA AND WITHOUT STATUS MIGRAINOSUS, NOT INTRACTABLE: ICD-10-CM

## 2017-07-18 RX ORDER — RIZATRIPTAN BENZOATE 5 MG/1
5-10 TABLET, ORALLY DISINTEGRATING ORAL
Qty: 18 TABLET | Refills: 3 | Status: SHIPPED | OUTPATIENT
Start: 2017-07-18

## 2017-07-18 RX ORDER — TOPIRAMATE 25 MG/1
TABLET, FILM COATED ORAL
Qty: 90 TABLET | Refills: 3 | Status: SHIPPED | OUTPATIENT
Start: 2017-07-18 | End: 2018-04-23

## 2017-07-18 ASSESSMENT — PAIN SCALES - GENERAL: PAINLEVEL: NO PAIN (0)

## 2017-07-18 NOTE — PROGRESS NOTES
Re: Dominique Shah      MRN# [id number]  YOB: 1995  Date of Visit:2017     OUTPATIENT NEUROLOGY VISIT NOTE    Chief Complaint:  Headache evaluation    History of Present Illness  Dominique Shah is a 21-year-old right-handed presents to the clinic today for headaches evaluation.     Headache History:      Onset History: Since HS. Has becaming more frequent for the past 2 years.     Current Headache Pattern:      Frequency (How many headache days per month?): at least 3-4 times per week. About 20 headache days per month.      Duration of Headache: one day     Aura: none     Associated Symptoms:  light sensitivity, sound sensitivity, irritability, visual floaters, tinnitus getting louder, has to lay down in the dark room       Description of Headache Pain & Location:  Always starts in the right occipital area and up to the bilateral temples      Level of Pain as headache is startin/10      Level of Pain during the worst headache:  8/10      Do headaches interfere with or prevent usual activities or diminish your productivity at home or work?  Yes - interferes with school.  Seeking RN degree at [employer].       Treatments Tried:  Cyclobenzaprine in HS  No relief with Ibuprofen or Tylenol  Nothing else     Have you needed to utilize the Emergency Room to treat your headache symptoms?  If so, how often and when was the last time used:  no    What makes your headaches better?  dark room, heat and sleeping    What makes your headaches worse or triggers your headaches? Nothing so far but light triggers the headache     Concussions due to sports in HS, right shoulder injuries and surgeries in HS  Sleeping 6-7 hours and naps around 3-4 pm. Goes to bed around 10 pm. Has to be at work at 6 am. 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   No caffeine or pop   Drinks water  No daily analgesics  use  Periods are regular and moderate, on Aviane for birth control and switched about 2 years.   Depression is stable and was in the past    Denies history of recent head or neck trauma, dizziness, vertigo, loss of consciousness, seizure, double vision,  speech or swallowing difficulty, weakness or numbness in face, arms or legs, urinary or bowel incontinence, coordination problems or gait difficulty, fever or chills.    Neurodiagnostic Testing    MRI BRAIN WITHOUT AND WITH CONTRAST  1/25/2017 4:12 PM     HISTORY: Left-sided pulsatile tinnitus, hearing swishing noise-like  heartbeat in the ear. Hearing loss and occasional achiness and  soreness in the ear. Gradual onset.      TECHNIQUE: Multiplanar, multisequence MRI of the brain without and  with 10mL Gadavist.     COMPARISON: CT head 2/13/2008     FINDINGS: The brain parenchyma, ventricles and subarachnoid spaces  appear normal. There is no evidence of hemorrhage, mass, acute  infarct, or anomaly. There are no gadolinium enhancing lesions.       The acoustic pathways appear normal. The temporal bones, internal  auditory canals and cerebellopontine angles appear normal with no  abnormal signal or enhancement in the labyrinths.     The facial structures appear normal. The arteries at the base of the  brain and the dural venous sinuses appear patent.          IMPRESSION: Normal MRI of the brain. No specific etiology for  pulsatile tinnitus can be identified.        ARCELIA NÚÑEZ MD    MR ANGIOGRAM OF THE HEAD WITHOUT CONTRAST   1/25/2017 4:14 PM      HISTORY: Left-sided pulsatile tinnitus and gradual onset of hearing  loss.     TECHNIQUE:  3D time-of-flight MR angiogram of the head without  contrast.     COMPARISON: None.     FINDINGS:  The visualized portions of the distal internal carotid and  vertebral arteries, the basilar artery, [place], and the  proximal anterior, middle and posterior cerebral arteries all appear  normal.  There is no evidence of  aneurysm or vascular stenosis or  occlusion.         IMPRESSION:  Normal MR angiogram of the head.        ARCELIA NÚÑEZ MD    CT TEMPORAL ORBITAL SELLA W/O CONTRAST 6/13/2017 11:01 AM     Provided History: Pulsatile tinnitus, left ear, Conductive hearing  loss, unilateral, left ear, with unrestricted hearing on the  contralateral side      Comparison: Brain MRI 1/25/2017      Technique: Using multidetector thin collimation helical acquisition  technique, axial and coronal thin section CT images were reconstructed  through both temporal bones. Additional reconstructions performed in  the planes of Poschl and Stenver were also obtained. Images were  reviewed in a bone window.     Findings:   Right temporal bone: The mastoid air cells are clear. There is no  debris in the external auditory canal. The tympanic membrane is  intact. There is no fluid or soft tissue thickening in the right  middle ear cavity. The bony ossicles are intact and in normal  alignment. The epitympanum is clear. The bony scutum is sharp. The  internal auditory canal and the labyrinthine structures are within  normal limits. The facial nerve canal appears normal along its course.     Left temporal bone: The mastoid air cells are clear. There is no  debris in the external auditory canal. The tympanic membrane is  intact. There is no fluid or soft tissue thickening in the left middle  ear cavity. The bony ossicles are intact and in normal alignment. The  epitympanum is clear. The bony scutum is sharp. The internal auditory  canal and the labyrinthine structures are within normal limits. The  facial nerve canal appears normal along its course.         Impression:  Normal CT study of the temporal bones.     I have personally reviewed the examination and initial interpretation  and I agree with the findings.     PEBBLES DO MD           Past Medical History reviewed and verified with the patient  Past Medical History:   Diagnosis Date     ASCUS  with positive high risk HPV 5/28/15    @ 19 y.o.     Conductive hearing loss 6 months age    Starting to notice a lot more the past few months     Depressive disorder      Hearing problem      Infectious mononucleosis     Age 7     Migraines Always have    Mild headaches are very common. 4-5 x a week     Rubella without mention of complication     Rubella     Tinnitus July 2016    Hear heart beat in my left ear. Constantly. Loud.     Past Surgical History reviewed and verified with the patient  Past Surgical History:   Procedure Laterality Date     ARTHROSCOPY SHOULDER CAPSULAR REPAIR  9/25/2013    Procedure: ARTHROSCOPY SHOULDER CAPSULAR REPAIR;  Right Shoulder Arthroscopy with  labral repair,   ;  Surgeon: Jay Jay Avila MD;  Location: [facility]     ESOPHAGOSCOPY, GASTROSCOPY, DUODENOSCOPY (EGD), COMBINED  5/9/2013    Procedure: COMBINED ESOPHAGOSCOPY, GASTROSCOPY, DUODENOSCOPY (EGD), BIOPSY SINGLE OR MULTIPLE;  esophagoscopy, gastroscopy, duodenoscopy EGD  with  multiple biopsies;  Surgeon: Lawrence Friedman MD;  Location: [facility]     LAPAROSCOPIC CHOLECYSTECTOMY  5/23/2013    Procedure: LAPAROSCOPIC CHOLECYSTECTOMY;  Laparoscopic Cholecystectomy;  Surgeon: Lawrence Friedman MD;  Location: [facility]     TRANSPOSITION ULNAR NERVE (ELBOW)  7/30/2014    Procedure: TRANSPOSITION ULNAR NERVE (ELBOW);  Surgeon: Jay Jay Avila MD;  Location: [facility] OR       Family History reviewed and verified with the patient  No neurological problems  Social History:  Dating for 7 years, stable relationship, no children,   Social History   Substance Use Topics     Smoking status: Never Smoker     Smokeless tobacco: Never Used     Alcohol use Yes      Comment: 2-3times a month, if even    reviewed and verified with the patient     Allergies   Allergen Reactions     Oxycodone Nausea and Vomiting     Vicodone has been going well.     Seasonal Allergies      Sulfa Drugs Rash       Current Outpatient Prescriptions   Medication  "Sig Dispense Refill     FLUoxetine (PROZAC) 10 MG capsule TAKE THREE CAPSULES BY MOUTH DAILY 90 capsule 5     levonorgestrel-ethinyl estradiol (AVIANE) 0.1-20 MG-MCG per tablet Take 1 tablet by mouth daily 84 tablet 3     ketoconazole (NIZORAL) 200 MG tablet TAKE ONE TABLET BY MOUTH EVERY DAY FOR 1 WEEK THEN TAKE 1 TABLET EITHER ONCE A WEEK OR ONCE A MONTH AS NEEDED 10 tablet 2     EQ LORATADINE 10 MG tablet TAKE ONE TABLET BY MOUTH ONCE DAILY 90 tablet 0     IBUPROFEN PO Take 200 mg by mouth every 6 hours Reported on 4/24/2017     reviewed and verified with the patient    Review of Systems:  A 12-point ROS including constitutional, eyes, ENT, respiratory, cardiovascular, gastroenterology, genitourinary, integumentary, musculoskeletal, neurology, hematology and psychiatric were all reviewed with the patient and completed at the [facility] and as mentioned in the HPI.   General Exam:   /74  Pulse 62  Ht 1.676 m (5' 6\")  GEN: Awake, NAD; good eye contact, responses appropriately, no headache today   HEENT: Head atraumatic/Normocephalic. Scalp normal. Pupils equally round, 4 mm, reactive to light and accommodation, sclera and conjunctiva normal. Fundoscopic examination reveals normal vessels no papilledema.   Neck: Easily moveable without resistance  Heart: S1/S2 appreciated, RRR, no m/r/g, no carotid bruits  Lungs:Lungs are clear to auscultation bilaterally, no wheezes or crackles.   Neurological Examination:  The patient is alert and oriented times four.Speech is fluent without dysarthria.   Cranial nerves:  CN I deferred.   CN II: Intact and full visual fields to confrontation bilaterally.   CN III, IV, VI: EOM intact. There is no nystagmus. Has conjugated gaze. Intact direct and consensual pupillary light reflexes.   CN V: Intact and symmetrical to facial sensation in the V1 through V3 bilaterally.   CN VII: Intact and symmetrical eyebrow and lid raise and eyelid closure, smiles and " frown.   CN VIII: Intact to finger rub bilaterally.   CN IX and X: The palates elevates symmetrical. The uvula is midline.   CN XII: The tongue protrudes midline with no atrophy or fasciculations.   Motor exam: The patient has a normal bulk and tone throughout. There is no atrophy, fasciculations, clonus, or abnormal movements appreciated.   Strength Exam:  5/5 strength at shoulder abduction, elbow flexion or extension, wrist flexion or extension, finger abduction, , hip flexion and extension, knee flexion and extension, and dorsiflexion and plantarflexion bilaterally.   Sensation is intact to light touch throughout.   Reflexes are 2+ and symmetrical at biceps, triceps, brachioradialis, patellar, and Achilles.   Negative Babinski with downgoing toes bilaterally.   Coordination reveals finger-nose-finger with normal speed and accuracy.   Station and gait is normal. Has no drift and a negative Romberg. Ronald's negative        Assessment and Plan:  Most likely migraine without aura,  chronic. Headache frequency -20 headache days per month. Non focal neurological exam. Normal brain MRI and MRA findings.   Headache diary and headache food triggers discussed and given hand outs. Recommended a trial of headache preventive treatment. Discussed headache preventive treatment options-conventional and alternative.  Discussed acute treatment with triptans-sumatriptan vs rizatriptan.   Plan:  Headache dairy  Vitamin B2 400 mg daily OTC  A trial of topiramate 25 mg at bedtime for one week, then 25 mg am and 25 mg pm for one week, then 25 mg am and 50 mg pm  A trial of rizatriptan 5-10 mg ODT at the headache onset. May repeat in 2 hours as needed. Max 30 mg in 24 hours and limit use to no more than 2 days per week due to risk of rebound headaches  Talk to your PCP about your current OCP and its relation to increased headache frequency.   Serotonin syndrome symptoms usually occur within several hours of taking a new drug or  increasing the dose of a drug you're already taking.   Signs and symptoms include:   Agitation or restlessness  Rapid heart rate and high blood pressure  Dilated pupils  Loss of muscle coordination or twitching muscles  Muscle rigidity  Heavy sweating  Diarrhea  Headache  Shivering, Goose bumps  Severe serotonin syndrome can be life-threatening. Signs and symptoms include:High fever, Seizures, Irregular heartbeat, Unconsciousness    Prescriptions for topiramate and rizatriptan  provided. Correct use and course provided. Expected benefits and typical side effects reviewed. Safety of concomitant medications and interactions reviewed. Patient taught signs and symptoms of adverse reactions and allergies. Patient understands teaching and accepts risks of prescribed medication regimen.  Discussed that topiramate may decrease OCP efficacy and she needs to use pregnancy prevention back up plan.     I discussed all my recommendation with Dominique Shah. The patient verbalizes understanding and comfortable with the plan. The patient has our clinic phone number to call with any questions or concerns. All of the patient's questions were answered from the best of my current knowledge. Follow up in  4-6  weeks or sooner if needed via phone.      Thank you for letting me be a part of the treatment team for Dominique Shah      Time spent with pt answering questions, discussing findings, counseling and coordinating care was more than 50% the appointment time,  60 minutes.         CARLENE Vilchis, CNP  [facility]    Answers for HPI/ROS submitted by the patient on 7/5/2017   General Symptoms: No  Skin Symptoms: No  HENT Symptoms: Yes  EYE SYMPTOMS: Yes  HEART SYMPTOMS: Yes  LUNG SYMPTOMS: No  INTESTINAL SYMPTOMS: No  URINARY SYMPTOMS: No  GYNECOLOGIC SYMPTOMS: No  BREAST SYMPTOMS: No  SKELETAL SYMPTOMS: No  BLOOD SYMPTOMS: No  NERVOUS SYSTEM SYMPTOMS: No  MENTAL HEALTH SYMPTOMS: No  Ear pain: Yes  Ear  discharge: No  Hearing loss: Yes  Tinnitus: Yes  Nosebleeds: No  Congestion: No  Sinus pain: No  Trouble swallowing: No   Voice hoarseness: No  Mouth sores: No  Sore throat: No  Tooth pain: No  Gum tenderness: No  Bleeding gums: No  Change in taste: No  Change in sense of smell: No  Dry mouth: No  Hearing aid used: No  Neck lump: No  Eye pain: No  Vision loss: No  Dry eyes: No  Watery eyes: No  Eye bulging: No  Double vision: No  Flashing of lights: No  Spots: Yes  Floaters: Yes  Redness: No  Crossed eyes: No  Tunnel Vision: No  Yellowing of eyes: No  Eye irritation: No  Chest pain or pressure: No  Fast or irregular heartbeat: Yes  Pain in legs with walking: No  Swelling in feet or ankles: No  Trouble breathing while lying down: No  Fingers or Toes appear blue: No  High blood pressure: No  Low blood pressure: No  Fainting: No  Murmurs: No  Chest pain on exertion: No  Chest pain at rest: No  Cramping pain in leg during exercise: No  Pacemaker: No  Varicose veins: No  Edema or swelling: No  Fast heart beat: Yes  Wake up at night with shortness of breath: No  Heart flutters: No  Light-headedness: No  Exercise intolerance: No

## 2017-07-18 NOTE — MR AVS SNAPSHOT
After Visit Summary   7/18/2017    Dominique Shah    MRN: [id number]           Patient Information     Date Of Birth          1995        Visit Information        Provider Department      7/18/2017 10:30 AM Minal Rodriguez APRN CNP [facility]        Today's Diagnoses     Chronic migraine    -  1    Migraine without aura and without status migrainosus, not intractable          Care Instructions    Plan:  Headache dairy  Vitamin B2 400 mg daily OTC  A trial of topiramate 25 mg at bedtime for one week, then 25 mg am and 25 mg pm for one week, then 25 mg am and 50 mg pm  A trial of rizatriptan 5-10 mg ODT at the headache onset. May repeat in 2 hours as needed. Max 30 mg in 24 hours and limit use to no more than 2 days per week due to risk of rebound headaches  Talk to your PCP about your current OCP and its relation to increased headache frequency.   topiramate may decrease OCP efficacy so use pregnancy prevention back up plan.  Follow up 4-6 weeks via phone    Serotonin syndrome symptoms usually occur within several hours of taking a new drug or increasing the dose of a drug you're already taking.   Signs and symptoms include:   Agitation or restlessness  Rapid heart rate and high blood pressure  Dilated pupils  Loss of muscle coordination or twitching muscles  Muscle rigidity  Heavy sweating  Diarrhea  Headache  Shivering, Goose bumps  Severe serotonin syndrome can be life-threatening. Signs and symptoms include:High fever, Seizures, Irregular heartbeat, Unconsciousness      Patient Education    Topiramate Oral capsule, extended-release    Topiramate Oral capsule, sprinkles    Topiramate Oral tablet  Topiramate Oral tablet  What is this medicine?  TOPIRAMATE (toe PYRE a mate) is used to treat seizures in adults or children with epilepsy. It is also used for the prevention of migraine headaches.  This medicine may be used for other purposes; ask your health care provider  or pharmacist if you have questions.  What should I tell my health care provider before I take this medicine?  They need to know if you have any of these conditions:    bleeding disorders    cirrhosis of the liver or liver disease    diarrhea    glaucoma    kidney stones or kidney disease    low blood counts, like low white cell, platelet, or red cell counts    lung disease like asthma, obstructive pulmonary disease, emphysema    metabolic acidosis    on a ketogenic diet    schedule for surgery or a procedure    suicidal thoughts, plans, or attempt; a previous suicide attempt by you or a family member    an unusual or allergic reaction to topiramate, other medicines, foods, dyes, or preservatives    pregnant or trying to get pregnant    breast-feeding  How should I use this medicine?  Take this medicine by mouth with a glass of water. Follow the directions on the prescription label. Do not crush or chew. You may take this medicine with meals. Take your medicine at regular intervals. Do not take it more often than directed.  Talk to your pediatrician regarding the use of this medicine in children. Special care may be needed. While this drug may be prescribed for children as young as 2 years of age for selected conditions, precautions do apply.  Overdosage: If you think you have taken too much of this medicine contact a poison control center or emergency room at once.  NOTE: This medicine is only for you. Do not share this medicine with others.  What if I miss a dose?  If you miss a dose, take it as soon as you can. If your next dose is to be taken in less than 6 hours, then do not take the missed dose. Take the next dose at your regular time. Do not take double or extra doses.  What may interact with this medicine?  Do not take this medicine with any of the following medications:    probenecid  This medicine may also interact with the following medications:    acetazolamide    alcohol    amitriptyline    aspirin and  aspirin-like medicines    birth control pills    certain medicines for depression    certain medicines for seizures    certain medicines that treat or prevent blood clots like warfarin, enoxaparin, dalteparin, apixaban, dabigatran, and rivaroxaban    digoxin    hydrochlorothiazide    lithium    medicines for pain, sleep, or muscle relaxation    metformin    methazolamide    NSAIDS, medicines for pain and inflammation, like ibuprofen or naproxen    pioglitazone    risperidone  This list may not describe all possible interactions. Give your health care provider a list of all the medicines, herbs, non-prescription drugs, or dietary supplements you use. Also tell them if you smoke, drink alcohol, or use illegal drugs. Some items may interact with your medicine.  What should I watch for while using this medicine?  Visit your doctor or health care professional for regular checks on your progress. Do not stop taking this medicine suddenly. This increases the risk of seizures if you are using this medicine to control epilepsy. Wear a medical identification bracelet or chain to say you have epilepsy or seizures, and carry a card that lists all your medicines.  This medicine can decrease sweating and increase your body temperature. Watch for signs of  sweating or fever, especially in children. Avoid extreme heat, hot baths, and saunas. Be careful about exercising, especially in hot weather. Contact your health care provider right away if you notice a fever or decrease in sweating.  You should drink plenty of fluids while taking this medicine. If you have had kidney stones in the past, this will help to reduce your chances of forming kidney stones.  If you have stomach pain, with nausea or vomiting and yellowing of your eyes or skin, call your doctor immediately.  You may get drowsy, dizzy, or have blurred vision. Do not drive, use machinery, or do anything that needs mental alertness until you know how this medicine  affects you. To reduce dizziness, do not sit or stand up quickly, especially if you are an older patient. Alcohol can increase drowsiness and dizziness. Avoid alcoholic drinks.  If you notice blurred vision, eye pain, or other eye problems, seek medical attention at once for an eye exam.  The use of this medicine may increase the chance of suicidal thoughts or actions. Pay special attention to how you are responding while on this medicine. Any worsening of mood, or thoughts of suicide or dying should be reported to your health care professional right away.  This medicine may increase the chance of developing metabolic acidosis. If left untreated, this can cause kidney stones, bone disease, or slowed growth in children. Symptoms include breathing fast, fatigue, loss of appetite, irregular heartbeat, or loss of consciousness. Call your doctor immediately if you experience any of these side effects. Also, tell your doctor about any surgery you plan on having while taking this medicine since this may increase your risk for metabolic acidosis.  Birth control pills may not work properly while you are taking this medicine. Talk to your doctor about using an extra method of birth control.  Women who become pregnant while using this medicine may enroll in the [place] Antiepileptic Drug Pregnancy Registry by calling 1-135.530.9201. This registry collects information about the safety of antiepileptic drug use during pregnancy.  What side effects may I notice from receiving this medicine?  Side effects that you should report to your doctor or health care professional as soon as possible:    allergic reactions like skin rash, itching or hives, swelling of the face, lips, or tongue    decreased sweating and/or rise in body temperature    depression    difficulty breathing, fast or irregular breathing patterns    difficulty speaking    difficulty walking or controlling muscle movements    hearing impairment    redness,  blistering, peeling or loosening of the skin, including inside the mouth    tingling, pain or numbness in the hands or feet    unusual bleeding or bruising    unusually weak or tired    worsening of mood, thoughts or actions of suicide or dying  Side effects that usually do not require medical attention (report to your doctor or health care professional if they continue or are bothersome):    altered taste    back pain, joint or muscle aches and pains    diarrhea, or constipation    headache    loss of appetite    nausea    stomach upset, indigestion    tremors  This list may not describe all possible side effects. Call your doctor for medical advice about side effects. You may report side effects to FDA at 9-662-HZH-8308.  Where should I keep my medicine?  Keep out of the reach of children.  Store at room temperature between 15 and 30 degrees C (59 and 86 degrees F) in a tightly closed container. Protect from moisture. Throw away any unused medicine after the expiration date.  NOTE:This sheet is a summary. It may not cover all possible information. If you have questions about this medicine, talk to your doctor, pharmacist, or health care provider. Copyright  2016 Gold Standard        Rizatriptan disintegrating tablets  What is this medicine?  RIZATRIPTAN (rye za TRIP tan) is used to treat migraines with or without aura. An aura is a strange feeling or visual disturbance that warns you of an attack. It is not used to prevent migraines.  How should I use this medicine?  Take this medicine by mouth. Follow the directions on the prescription label. This medicine is taken at the first symptoms of a migraine. It is not for everyday use. Leave the tablet in the foil package until you are ready to take it. Do not push the tablet through the blister pack. Peel open the blister pack with dry hands and place the tablet on your tongue. The tablet will dissolve rapidly and be swallowed in your saliva. It is not necessary to  drink any water to take this medicine. If your migraine headache returns after one dose, you can take another dose as directed. You must leave at least 2 hours between doses, and do not take more than 30 mg total in 24 hours. If there is no improvement at all after the first dose, do not take a second dose without talking to your doctor or health care professional. Do not take your medicine more often than directed.  Talk to your pediatrician regarding the use of this medicine in children. While this drug may be prescribed for children as young as 6 years for selected conditions, precautions do apply.  What side effects may I notice from receiving this medicine?  Side effects that you should report to your doctor or health care professional as soon as possible:    allergic reactions like skin rash, itching or hives, swelling of the face, lips, or tongue    fast, slow, or irregular heart beat    increased or decreased blood pressure    seizures    severe stomach pain and cramping, bloody diarrhea    signs and symptoms of a blood clot such as breathing problems; changes in vision; chest pain; severe, sudden headache; pain, swelling, warmth in the leg; trouble speaking; sudden numbness or weakness of the face, arm or leg    tingling, pain, or numbness in the face, hands, or feet  Side effects that usually do not require medical attention (report to your doctor or health care professional if they continue or are bothersome):    drowsiness    dry mouth    feeling warm, flushing, or redness of the face    headache    muscle cramps, pain    nausea, vomiting    unusually weak or tired  What may interact with this medicine?  Do not take this medicine with any of the following medicines:    amphetamine, dextroamphetamine or cocaine    dihydroergotamine, ergotamine, ergoloid mesylates, methysergide, or ergot-type medication - do not take within 24 hours of taking rizatriptan    feverfew    MAOIs like Carbex, Eldepryl, Marplan,  Nardil, and Parnate - do not take rizatriptan within 2 weeks of stopping MAOI therapy.    other migraine medicines like almotriptan, eletriptan, naratriptan, sumatriptan, zolmitriptan - do not take within 24 hours of taking rizatriptan    tryptophan  This medicine may also interact with the following medications:    medicines for mental depression, anxiety or mood problems    propranolol  What if I miss a dose?  This does not apply; this medicine is not for regular use.  Where should I keep my medicine?  Keep out of the reach of children.  Store at room temperature between 15 and 30 degrees C (59 and 86 degrees F). Protect from light and moisture. Throw away any unused medicine after the expiration date.  What should I tell my health care provider before I take this medicine?  They need to know if you have any of these conditions:    bowel disease or colitis    diabetes    family history of heart disease    fast or irregular heart beat    heart or blood vessel disease, angina (chest pain), or previous heart attack    high blood pressure    high cholesterol    history of stroke, transient ischemic attacks (TIAs or mini-strokes), or intracranial bleeding    kidney or liver disease    overweight    poor circulation    postmenopausal or surgical removal of uterus and ovaries    an unusual or allergic reaction to rizatriptan, other medicines, foods, dyes, or preservatives    pregnant or trying to get pregnant    breast-feeding  What should I watch for while using this medicine?  Only take this medicine for a migraine headache. Take it if you get warning symptoms or at the start of a migraine attack. It is not for regular use to prevent migraine attacks.  You may get drowsy or dizzy. Do not drive, use machinery, or do anything that needs mental alertness until you know how this medicine affects you. To reduce dizzy or fainting spells, do not sit or stand up quickly, especially if you are an older patient. Alcohol can  increase drowsiness, dizziness and flushing. Avoid alcoholic drinks.  Smoking cigarettes may increase the risk of heart-related side effects from using this medicine.  If you take migraine medicines for 10 or more days a month, your migraines may get worse. Keep a diary of headache days and medicine use. Contact your healthcare professional if your migraine attacks occur more frequently.  NOTE:This sheet is a summary. It may not cover all possible information. If you have questions about this medicine, talk to your doctor, pharmacist, or health care provider. Copyright  2017 Gold Standard                Follow-ups after your visit        Your next 10 appointments already scheduled     Aug 15, 2017  1:45 PM CDT   Telephone Call with CARLENE Chapman [facility] ([facility] [place])    [address] [address] [phone]           Note: this is not an onsite visit; there is no need to come to the facility.              Who to contact     Please call your clinic at [phone] to:    Ask questions about your health    Make or cancel appointments    Discuss your medicines    Learn about your test results    Speak to your doctor   If you have compliments or concerns about an experience at your clinic, or if you wish to file a complaint, please contact [facility] Patient Relations at [phone] or email us at [email or web address]         Additional Information About Your Visit        [employer] Information     [employer] gives you secure access to your electronic health record. If you see a primary care provider, you can also send messages to your care team and make appointments. If you have questions, please call your primary care clinic.  If you do not have a primary care provider, please call [phone] and they will assist you.      [employer] is an electronic gateway that provides easy, online  "access to your medical records. With [employer], you can request a clinic appointment, read your test results, renew a prescription or communicate with your care team.     To access your existing account, please contact your [facility] or call [phone] for assistance.        Care EveryWhere ID     This is your Care EveryWhere ID. This could be used by other organizations to access your [place] medical records  [id number]        Your Vitals Were     Pulse Height                62 1.676 m (5' 6\")           Blood Pressure from Last 3 Encounters:   07/18/17 118/74   04/24/17 118/70   12/15/16 122/66    Weight from Last 3 Encounters:   06/13/17 107 kg (236 lb)   04/24/17 111.1 kg (245 lb)   01/19/17 111.6 kg (246 lb)              Today, you had the following     No orders found for display         Today's Medication Changes          These changes are accurate as of: 7/18/17 11:59 AM.  If you have any questions, ask your nurse or doctor.               Start taking these medicines.        Dose/Directions    rizatriptan 5 MG ODT tab   Commonly known as:  MAXALT-MLT   Used for:  Migraine without aura and without status migrainosus, not intractable   Started by:  Minal Rodriguez APRN CNP        Dose:  5-10 mg   Take 1-2 tablets (5-10 mg) by mouth at onset of headache for migraine (May repeat in 2 hours as needed. Max 30 mg in 24 hours)   Quantity:  18 tablet   Refills:  3       topiramate 25 MG tablet   Commonly known as:  TOPAMAX   Used for:  Chronic migraine   Started by:  Minal Rodriguez APRN CNP        Take 25 mg at bedtime for one week, then 25 mg am and 25 mg pm for one week, then 25 mg am and 50 mg pm   Quantity:  90 tablet   Refills:  3            Where to get your medicines      These medications were sent to [facility] [address] [place] [address] [address] [facility]  [address] [facility] [address]     Phone:  [phone]     rizatriptan 5 " MG ODT tab    topiramate 25 MG tablet                Primary Care Provider Office Phone # Fax #    Fide Scruggs PA-C [phone]       [facility] [address] [place] [address]        Equal Access to Services     ERIN BRAN : Hadii aad ku hadvegao Soomaali, waaxda luqadaha, qaybta kaalmada adeegyada, waxay idiin hayaan adealva hurtado ladorindan dayo. So [facility] [phone].    ATENCIÓN: Si habla español, tiene a calderón disposición servicios gratuitos de asistencia lingüística. Llame al [phone].    We comply with applicable federal civil rights laws and [place] laws. We do not discriminate on the basis of race, color, national origin, age, disability sex, sexual orientation or gender identity.            Thank you!     Thank you for choosing [facility]  for your care. Our goal is always to provide you with excellent care. Hearing back from our patients is one way we can continue to improve our services. Please take a few minutes to complete the written survey that you may receive in the mail after your visit with us. Thank you!             Your Updated Medication List - Protect others around you: Learn how to safely use, store and throw away your medicines at www.disposemymeds.org.          This list is accurate as of: 7/18/17 11:59 AM.  Always use your most recent med list.                   Brand Name Dispense Instructions for use Diagnosis    EQ LORATADINE 10 MG tablet   Generic drug:  loratadine     90 tablet    TAKE ONE TABLET BY MOUTH ONCE DAILY    Allergic rhinitis, unspecified allergic rhinitis type       FLUoxetine 10 MG capsule    PROzac    90 capsule    TAKE THREE CAPSULES BY MOUTH DAILY    Major depression in complete remission (H)       IBUPROFEN PO      Take 200 mg by mouth every 6 hours Reported on 4/24/2017        ketoconazole 200 MG tablet    NIZORAL    10 tablet    TAKE ONE TABLET BY MOUTH EVERY DAY FOR 1 WEEK THEN TAKE 1 TABLET EITHER ONCE A WEEK OR ONCE A  MONTH AS NEEDED        levonorgestrel-ethinyl estradiol 0.1-20 MG-MCG per tablet    AVIANE    84 tablet    Take 1 tablet by mouth daily    Dysmenorrhea       rizatriptan 5 MG ODT tab    MAXALT-MLT    18 tablet    Take 1-2 tablets (5-10 mg) by mouth at onset of headache for migraine (May repeat in 2 hours as needed. Max 30 mg in 24 hours)    Migraine without aura and without status migrainosus, not intractable       topiramate 25 MG tablet    TOPAMAX    90 tablet    Take 25 mg at bedtime for one week, then 25 mg am and 25 mg pm for one week, then 25 mg am and 50 mg pm    Chronic migraine

## 2017-07-18 NOTE — PATIENT INSTRUCTIONS
Plan:  Headache dairy  Vitamin B2 400 mg daily OTC  A trial of topiramate 25 mg at bedtime for one week, then 25 mg am and 25 mg pm for one week, then 25 mg am and 50 mg pm  A trial of rizatriptan 5-10 mg ODT at the headache onset. May repeat in 2 hours as needed. Max 30 mg in 24 hours and limit use to no more than 2 days per week due to risk of rebound headaches  Talk to your PCP about your current OCP and its relation to increased headache frequency.   topiramate may decrease OCP efficacy so use pregnancy prevention back up plan.  Follow up 4-6 weeks via phone    Serotonin syndrome symptoms usually occur within several hours of taking a new drug or increasing the dose of a drug you're already taking.   Signs and symptoms include:   Agitation or restlessness  Rapid heart rate and high blood pressure  Dilated pupils  Loss of muscle coordination or twitching muscles  Muscle rigidity  Heavy sweating  Diarrhea  Headache  Shivering, Goose bumps  Severe serotonin syndrome can be life-threatening. Signs and symptoms include:High fever, Seizures, Irregular heartbeat, Unconsciousness      Patient Education    Topiramate Oral capsule, extended-release    Topiramate Oral capsule, sprinkles    Topiramate Oral tablet  Topiramate Oral tablet  What is this medicine?  TOPIRAMATE (toe PYRE a mate) is used to treat seizures in adults or children with epilepsy. It is also used for the prevention of migraine headaches.  This medicine may be used for other purposes; ask your health care provider or pharmacist if you have questions.  What should I tell my health care provider before I take this medicine?  They need to know if you have any of these conditions:    bleeding disorders    cirrhosis of the liver or liver disease    diarrhea    glaucoma    kidney stones or kidney disease    low blood counts, like low white cell, platelet, or red cell counts    lung disease like asthma, obstructive pulmonary disease, emphysema    metabolic  acidosis    on a ketogenic diet    schedule for surgery or a procedure    suicidal thoughts, plans, or attempt; a previous suicide attempt by you or a family member    an unusual or allergic reaction to topiramate, other medicines, foods, dyes, or preservatives    pregnant or trying to get pregnant    breast-feeding  How should I use this medicine?  Take this medicine by mouth with a glass of water. Follow the directions on the prescription label. Do not crush or chew. You may take this medicine with meals. Take your medicine at regular intervals. Do not take it more often than directed.  Talk to your pediatrician regarding the use of this medicine in children. Special care may be needed. While this drug may be prescribed for children as young as 2 years of age for selected conditions, precautions do apply.  Overdosage: If you think you have taken too much of this medicine contact a poison control center or emergency room at once.  NOTE: This medicine is only for you. Do not share this medicine with others.  What if I miss a dose?  If you miss a dose, take it as soon as you can. If your next dose is to be taken in less than 6 hours, then do not take the missed dose. Take the next dose at your regular time. Do not take double or extra doses.  What may interact with this medicine?  Do not take this medicine with any of the following medications:    probenecid  This medicine may also interact with the following medications:    acetazolamide    alcohol    amitriptyline    aspirin and aspirin-like medicines    birth control pills    certain medicines for depression    certain medicines for seizures    certain medicines that treat or prevent blood clots like warfarin, enoxaparin, dalteparin, apixaban, dabigatran, and rivaroxaban    digoxin    hydrochlorothiazide    lithium    medicines for pain, sleep, or muscle relaxation    metformin    methazolamide    NSAIDS, medicines for pain and inflammation, like ibuprofen or  naproxen    pioglitazone    risperidone  This list may not describe all possible interactions. Give your health care provider a list of all the medicines, herbs, non-prescription drugs, or dietary supplements you use. Also tell them if you smoke, drink alcohol, or use illegal drugs. Some items may interact with your medicine.  What should I watch for while using this medicine?  Visit your doctor or health care professional for regular checks on your progress. Do not stop taking this medicine suddenly. This increases the risk of seizures if you are using this medicine to control epilepsy. Wear a medical identification bracelet or chain to say you have epilepsy or seizures, and carry a card that lists all your medicines.  This medicine can decrease sweating and increase your body temperature. Watch for signs of  sweating or fever, especially in children. Avoid extreme heat, hot baths, and saunas. Be careful about exercising, especially in hot weather. Contact your health care provider right away if you notice a fever or decrease in sweating.  You should drink plenty of fluids while taking this medicine. If you have had kidney stones in the past, this will help to reduce your chances of forming kidney stones.  If you have stomach pain, with nausea or vomiting and yellowing of your eyes or skin, call your doctor immediately.  You may get drowsy, dizzy, or have blurred vision. Do not drive, use machinery, or do anything that needs mental alertness until you know how this medicine affects you. To reduce dizziness, do not sit or stand up quickly, especially if you are an older patient. Alcohol can increase drowsiness and dizziness. Avoid alcoholic drinks.  If you notice blurred vision, eye pain, or other eye problems, seek medical attention at once for an eye exam.  The use of this medicine may increase the chance of suicidal thoughts or actions. Pay special attention to how you are responding while on this  medicine. Any worsening of mood, or thoughts of suicide or dying should be reported to your health care professional right away.  This medicine may increase the chance of developing metabolic acidosis. If left untreated, this can cause kidney stones, bone disease, or slowed growth in children. Symptoms include breathing fast, fatigue, loss of appetite, irregular heartbeat, or loss of consciousness. Call your doctor immediately if you experience any of these side effects. Also, tell your doctor about any surgery you plan on having while taking this medicine since this may increase your risk for metabolic acidosis.  Birth control pills may not work properly while you are taking this medicine. Talk to your doctor about using an extra method of birth control.  Women who become pregnant while using this medicine may enroll in the [place] Antiepileptic Drug Pregnancy Registry by calling 1-196.395.3442. This registry collects information about the safety of antiepileptic drug use during pregnancy.  What side effects may I notice from receiving this medicine?  Side effects that you should report to your doctor or health care professional as soon as possible:    allergic reactions like skin rash, itching or hives, swelling of the face, lips, or tongue    decreased sweating and/or rise in body temperature    depression    difficulty breathing, fast or irregular breathing patterns    difficulty speaking    difficulty walking or controlling muscle movements    hearing impairment    redness, blistering, peeling or loosening of the skin, including inside the mouth    tingling, pain or numbness in the hands or feet    unusual bleeding or bruising    unusually weak or tired    worsening of mood, thoughts or actions of suicide or dying  Side effects that usually do not require medical attention (report to your doctor or health care professional if they continue or are bothersome):    altered taste    back pain, joint or  muscle aches and pains    diarrhea, or constipation    headache    loss of appetite    nausea    stomach upset, indigestion    tremors  This list may not describe all possible side effects. Call your doctor for medical advice about side effects. You may report side effects to FDA at 7-867-FNX-3025.  Where should I keep my medicine?  Keep out of the reach of children.  Store at room temperature between 15 and 30 degrees C (59 and 86 degrees F) in a tightly closed container. Protect from moisture. Throw away any unused medicine after the expiration date.  NOTE:This sheet is a summary. It may not cover all possible information. If you have questions about this medicine, talk to your doctor, pharmacist, or health care provider. Copyright  2016 [employer]        Rizatriptan disintegrating tablets  What is this medicine?  RIZATRIPTAN (rye za TRIP tan) is used to treat migraines with or without aura. An aura is a strange feeling or visual disturbance that warns you of an attack. It is not used to prevent migraines.  How should I use this medicine?  Take this medicine by mouth. Follow the directions on the prescription label. This medicine is taken at the first symptoms of a migraine. It is not for everyday use. Leave the tablet in the foil package until you are ready to take it. Do not push the tablet through the blister pack. Peel open the blister pack with dry hands and place the tablet on your tongue. The tablet will dissolve rapidly and be swallowed in your saliva. It is not necessary to drink any water to take this medicine. If your migraine headache returns after one dose, you can take another dose as directed. You must leave at least 2 hours between doses, and do not take more than 30 mg total in 24 hours. If there is no improvement at all after the first dose, do not take a second dose without talking to your doctor or health care professional. Do not take your medicine more often than directed.  Talk to your  pediatrician regarding the use of this medicine in children. While this drug may be prescribed for children as young as 6 years for selected conditions, precautions do apply.  What side effects may I notice from receiving this medicine?  Side effects that you should report to your doctor or health care professional as soon as possible:    allergic reactions like skin rash, itching or hives, swelling of the face, lips, or tongue    fast, slow, or irregular heart beat    increased or decreased blood pressure    seizures    severe stomach pain and cramping, bloody diarrhea    signs and symptoms of a blood clot such as breathing problems; changes in vision; chest pain; severe, sudden headache; pain, swelling, warmth in the leg; trouble speaking; sudden numbness or weakness of the face, arm or leg    tingling, pain, or numbness in the face, hands, or feet  Side effects that usually do not require medical attention (report to your doctor or health care professional if they continue or are bothersome):    drowsiness    dry mouth    feeling warm, flushing, or redness of the face    headache    muscle cramps, pain    nausea, vomiting    unusually weak or tired  What may interact with this medicine?  Do not take this medicine with any of the following medicines:    amphetamine, dextroamphetamine or cocaine    dihydroergotamine, ergotamine, ergoloid mesylates, methysergide, or ergot-type medication - do not take within 24 hours of taking rizatriptan    feverfew    MAOIs like Carbex, Eldepryl, Marplan, Nardil, and Parnate - do not take rizatriptan within 2 weeks of stopping MAOI therapy.    other migraine medicines like almotriptan, eletriptan, naratriptan, sumatriptan, zolmitriptan - do not take within 24 hours of taking rizatriptan    tryptophan  This medicine may also interact with the following medications:    medicines for mental depression, anxiety or mood problems    propranolol  What if I miss a dose?  This does not  apply; this medicine is not for regular use.  Where should I keep my medicine?  Keep out of the reach of children.  Store at room temperature between 15 and 30 degrees C (59 and 86 degrees F). Protect from light and moisture. Throw away any unused medicine after the expiration date.  What should I tell my health care provider before I take this medicine?  They need to know if you have any of these conditions:    bowel disease or colitis    diabetes    family history of heart disease    fast or irregular heart beat    heart or blood vessel disease, angina (chest pain), or previous heart attack    high blood pressure    high cholesterol    history of stroke, transient ischemic attacks (TIAs or mini-strokes), or intracranial bleeding    kidney or liver disease    overweight    poor circulation    postmenopausal or surgical removal of uterus and ovaries    an unusual or allergic reaction to rizatriptan, other medicines, foods, dyes, or preservatives    pregnant or trying to get pregnant    breast-feeding  What should I watch for while using this medicine?  Only take this medicine for a migraine headache. Take it if you get warning symptoms or at the start of a migraine attack. It is not for regular use to prevent migraine attacks.  You may get drowsy or dizzy. Do not drive, use machinery, or do anything that needs mental alertness until you know how this medicine affects you. To reduce dizzy or fainting spells, do not sit or stand up quickly, especially if you are an older patient. Alcohol can increase drowsiness, dizziness and flushing. Avoid alcoholic drinks.  Smoking cigarettes may increase the risk of heart-related side effects from using this medicine.  If you take migraine medicines for 10 or more days a month, your migraines may get worse. Keep a diary of headache days and medicine use. Contact your healthcare professional if your migraine attacks occur more frequently.  NOTE:This sheet is a summary. It may not  cover all possible information. If you have questions about this medicine, talk to your doctor, pharmacist, or health care provider. Copyright  2017 [employer]

## 2017-07-18 NOTE — LETTER
2017       RE: Dominique Shah  [address]  [facility] [address]     Dear Colleague,    Thank you for referring your patient, Dominique Shah, to the [facility] NEUROLOGY at [facility]. Please see a copy of my visit note below.    Re: Dominique Shah      MRN# [id number]  YOB: 1995  Date of Visit:2017     OUTPATIENT NEUROLOGY VISIT NOTE    Chief Complaint:  Headache evaluation    History of Present Illness  Dominique Shah is a 21-year-old right-handed presents to the clinic today for headaches evaluation.     Headache History:      Onset History: Since HS. Has becaming more frequent for the past 2 years.     Current Headache Pattern:      Frequency (How many headache days per month?): at least 3-4 times per week. About 20 headache days per month.      Duration of Headache: one day     Aura: none     Associated Symptoms:  light sensitivity, sound sensitivity, irritability, visual floaters, tinnitus getting louder, has to lay down in the dark room       Description of Headache Pain & Location:  Always starts in the right occipital area and up to the bilateral temples      Level of Pain as headache is startin/10      Level of Pain during the worst headache:  8/10      Do headaches interfere with or prevent usual activities or diminish your productivity at home or work?  Yes - interferes with school.  Seeking RN degree at [employer].       Treatments Tried:  Cyclobenzaprine in HS  No relief with Ibuprofen or Tylenol  Nothing else     Have you needed to utilize the Emergency Room to treat your headache symptoms?  If so, how often and when was the last time used:  no    What makes your headaches better?  dark room, heat and sleeping    What makes your headaches worse or triggers your headaches? Nothing so far but light triggers the headache     Concussions due to sports in HS, right shoulder injuries and surgeries in  HS  Sleeping 6-7 hours and naps around 3-4 pm. Goes to bed around 10 pm. Has to be at work at 6 am. 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   No caffeine or pop   Drinks water  No daily analgesics use  Periods are regular and moderate, on Aviane for birth control and switched about 2 years.   Depression is stable and was in the past    Denies history of recent head or neck trauma, dizziness, vertigo, loss of consciousness, seizure, double vision,  speech or swallowing difficulty, weakness or numbness in face, arms or legs, urinary or bowel incontinence, coordination problems or gait difficulty, fever or chills.    Neurodiagnostic Testing    MRI BRAIN WITHOUT AND WITH CONTRAST  1/25/2017 4:12 PM     HISTORY: Left-sided pulsatile tinnitus, hearing swishing noise-like  heartbeat in the ear. Hearing loss and occasional achiness and  soreness in the ear. Gradual onset.      TECHNIQUE: Multiplanar, multisequence MRI of the brain without and  with 10mL Gadavist.     COMPARISON: CT head 2/13/2008     FINDINGS: The brain parenchyma, ventricles and subarachnoid spaces  appear normal. There is no evidence of hemorrhage, mass, acute  infarct, or anomaly. There are no gadolinium enhancing lesions.       The acoustic pathways appear normal. The temporal bones, internal  auditory canals and cerebellopontine angles appear normal with no  abnormal signal or enhancement in the labyrinths.     The facial structures appear normal. The arteries at the base of the  brain and the dural venous sinuses appear patent.          IMPRESSION: Normal MRI of the brain. No specific etiology for  pulsatile tinnitus can be identified.  ARCELIA NÚÑEZ MD    MR ANGIOGRAM OF THE HEAD WITHOUT CONTRAST   1/25/2017 4:14 PM      HISTORY: Left-sided pulsatile tinnitus and gradual onset of hearing  loss.     TECHNIQUE:  3D time-of-flight MR angiogram of the head  without  contrast.     COMPARISON: None.     FINDINGS:  The visualized portions of the distal internal carotid and  vertebral arteries, the basilar artery, [place], and the  proximal anterior, middle and posterior cerebral arteries all appear  normal.  There is no evidence of aneurysm or vascular stenosis or  occlusion.         IMPRESSION:  Normal MR angiogram of the head.        ARCELIA NÚÑEZ MD    CT TEMPORAL ORBITAL SELLA W/O CONTRAST 6/13/2017 11:01 AM     Provided History: Pulsatile tinnitus, left ear, Conductive hearing  loss, unilateral, left ear, with unrestricted hearing on the  contralateral side      Comparison: Brain MRI 1/25/2017      Technique: Using multidetector thin collimation helical acquisition  technique, axial and coronal thin section CT images were reconstructed  through both temporal bones. Additional reconstructions performed in  the planes of Poschl and Stenver were also obtained. Images were  reviewed in a bone window.     Findings:   Right temporal bone: The mastoid air cells are clear. There is no  debris in the external auditory canal. The tympanic membrane is  intact. There is no fluid or soft tissue thickening in the right  middle ear cavity. The bony ossicles are intact and in normal  alignment. The epitympanum is clear. The bony scutum is sharp. The  internal auditory canal and the labyrinthine structures are within  normal limits. The facial nerve canal appears normal along its course.     Left temporal bone: The mastoid air cells are clear. There is no  debris in the external auditory canal. The tympanic membrane is  intact. There is no fluid or soft tissue thickening in the left middle  ear cavity. The bony ossicles are intact and in normal alignment. The  epitympanum is clear. The bony scutum is sharp. The internal auditory  canal and the labyrinthine structures are within normal limits. The  facial nerve canal appears normal along its course.         Impression:   Normal CT study of the temporal bones.     I have personally reviewed the examination and initial interpretation  and I agree with the findings.     PEBBLES DO MD           Past Medical History reviewed and verified with the patient  Past Medical History:   Diagnosis Date     ASCUS with positive high risk HPV 5/28/15    @ 19 y.o.     Conductive hearing loss 6 months age    Starting to notice a lot more the past few months     Depressive disorder      Hearing problem      Infectious mononucleosis     Age 7     Migraines Always have    Mild headaches are very common. 4-5 x a week     Rubella without mention of complication     Rubella     Tinnitus July 2016    Hear heart beat in my left ear. Constantly. Loud.     Past Surgical History reviewed and verified with the patient  Past Surgical History:   Procedure Laterality Date     ARTHROSCOPY SHOULDER CAPSULAR REPAIR  9/25/2013    Procedure: ARTHROSCOPY SHOULDER CAPSULAR REPAIR;  Right Shoulder Arthroscopy with  labral repair,   ;  Surgeon: Jay Jay Avila MD;  Location: [facility] OR     ESOPHAGOSCOPY, GASTROSCOPY, DUODENOSCOPY (EGD), COMBINED  5/9/2013    Procedure: COMBINED ESOPHAGOSCOPY, GASTROSCOPY, DUODENOSCOPY (EGD), BIOPSY SINGLE OR MULTIPLE;  esophagoscopy, gastroscopy, duodenoscopy EGD  with  multiple biopsies;  Surgeon: Lawrence Friedman MD;  Location: [facility] GI     LAPAROSCOPIC CHOLECYSTECTOMY  5/23/2013    Procedure: LAPAROSCOPIC CHOLECYSTECTOMY;  Laparoscopic Cholecystectomy;  Surgeon: Lawrence Friedman MD;  Location: [facility] OR     TRANSPOSITION ULNAR NERVE (ELBOW)  7/30/2014    Procedure: TRANSPOSITION ULNAR NERVE (ELBOW);  Surgeon: Jay Jay Avila MD;  Location: [facility] OR       Family History reviewed and verified with the patient  No neurological problems  Social History:  Dating for 7 years, stable relationship, no children,   Social History   Substance Use Topics     Smoking status: Never Smoker     Smokeless tobacco: Never Used     Alcohol  "use Yes      Comment: 2-3times a month, if even    reviewed and verified with the patient     Allergies   Allergen Reactions     Oxycodone Nausea and Vomiting     Vicodone has been going well.     Seasonal Allergies      Sulfa Drugs Rash       Current Outpatient Prescriptions   Medication Sig Dispense Refill     FLUoxetine (PROZAC) 10 MG capsule TAKE THREE CAPSULES BY MOUTH DAILY 90 capsule 5     levonorgestrel-ethinyl estradiol (AVIANE) 0.1-20 MG-MCG per tablet Take 1 tablet by mouth daily 84 tablet 3     ketoconazole (NIZORAL) 200 MG tablet TAKE ONE TABLET BY MOUTH EVERY DAY FOR 1 WEEK THEN TAKE 1 TABLET EITHER ONCE A WEEK OR ONCE A MONTH AS NEEDED 10 tablet 2     EQ LORATADINE 10 MG tablet TAKE ONE TABLET BY MOUTH ONCE DAILY 90 tablet 0     IBUPROFEN PO Take 200 mg by mouth every 6 hours Reported on 4/24/2017     reviewed and verified with the patient    Review of Systems:  A 12-point ROS including constitutional, eyes, ENT, respiratory, cardiovascular, gastroenterology, genitourinary, integumentary, musculoskeletal, neurology, hematology and psychiatric were all reviewed with the patient and completed at the Neuroscience Services Question nary and as mentioned in the HPI.   General Exam:   /74  Pulse 62  Ht 1.676 m (5' 6\")  GEN: Awake, NAD; good eye contact, responses appropriately, no headache today   HEENT: Head atraumatic/Normocephalic. Scalp normal. Pupils equally round, 4 mm, reactive to light and accommodation, sclera and conjunctiva normal. Fundoscopic examination reveals normal vessels no papilledema.   Neck: Easily moveable without resistance  Heart: S1/S2 appreciated, RRR, no m/r/g, no carotid bruits  Lungs:Lungs are clear to auscultation bilaterally, no wheezes or crackles.   Neurological Examination:  The patient is alert and oriented times four.Speech is fluent without dysarthria.   Cranial nerves:  CN I deferred.   CN II: Intact and full visual fields to confrontation bilaterally.   CN III, " IV, VI: EOM intact. There is no nystagmus. Has conjugated gaze. Intact direct and consensual pupillary light reflexes.   CN V: Intact and symmetrical to facial sensation in the V1 through V3 bilaterally.   CN VII: Intact and symmetrical eyebrow and lid raise and eyelid closure, smiles and frown.   CN VIII: Intact to finger rub bilaterally.   CN IX and X: The palates elevates symmetrical. The uvula is midline.   CN XII: The tongue protrudes midline with no atrophy or fasciculations.   Motor exam: The patient has a normal bulk and tone throughout. There is no atrophy, fasciculations, clonus, or abnormal movements appreciated.   Strength Exam:  5/5 strength at shoulder abduction, elbow flexion or extension, wrist flexion or extension, finger abduction, , hip flexion and extension, knee flexion and extension, and dorsiflexion and plantarflexion bilaterally.   Sensation is intact to light touch throughout.   Reflexes are 2+ and symmetrical at biceps, triceps, brachioradialis, patellar, and Achilles.   Negative Babinski with downgoing toes bilaterally.   Coordination reveals finger-nose-finger with normal speed and accuracy.   Station and gait is normal. Has no drift and a negative Romberg. Lhermitte's negative        Assessment and Plan:  Most likely migraine without aura,  chronic. Headache frequency -20 headache days per month. Non focal neurological exam. Normal brain MRI and MRA findings.   Headache diary and headache food triggers discussed and given hand outs. Recommended a trial of headache preventive treatment. Discussed headache preventive treatment options-conventional and alternative.  Discussed acute treatment with triptans-sumatriptan vs rizatriptan.   Plan:  Headache dairy  Vitamin B2 400 mg daily OTC  A trial of topiramate 25 mg at bedtime for one week, then 25 mg am and 25 mg pm for one week, then 25 mg am and 50 mg pm  A trial of rizatriptan 5-10 mg ODT at the headache onset. May repeat in 2 hours as  needed. Max 30 mg in 24 hours and limit use to no more than 2 days per week due to risk of rebound headaches  Talk to your PCP about your current OCP and its relation to increased headache frequency.   Serotonin syndrome symptoms usually occur within several hours of taking a new drug or increasing the dose of a drug you're already taking.   Signs and symptoms include:   Agitation or restlessness  Rapid heart rate and high blood pressure  Dilated pupils  Loss of muscle coordination or twitching muscles  Muscle rigidity  Heavy sweating  Diarrhea  Headache  Shivering, Goose bumps  Severe serotonin syndrome can be life-threatening. Signs and symptoms include:High fever, Seizures, Irregular heartbeat, Unconsciousness    Prescriptions for topiramate and rizatriptan  provided. Correct use and course provided. Expected benefits and typical side effects reviewed. Safety of concomitant medications and interactions reviewed. Patient taught signs and symptoms of adverse reactions and allergies. Patient understands teaching and accepts risks of prescribed medication regimen.  Discussed that topiramate may decrease OCP efficacy and she needs to use pregnancy prevention back up plan.     I discussed all my recommendation with Dominique Shah. The patient verbalizes understanding and comfortable with the plan. The patient has our clinic phone number to call with any questions or concerns. All of the patient's questions were answered from the best of my current knowledge. Follow up in  4-6  weeks or sooner if needed via phone.      Thank you for letting me be a part of the treatment team for Dominique Shah      Time spent with pt answering questions, discussing findings, counseling and coordinating care was more than 50% the appointment time,  60 minutes.     CARLENE Chapman CNP

## 2017-08-03 DIAGNOSIS — H93.19 TINNITUS: Primary | ICD-10-CM

## 2017-11-15 DIAGNOSIS — N94.6 DYSMENORRHEA: ICD-10-CM

## 2017-11-15 RX ORDER — LEVONORGESTREL AND ETHINYL ESTRADIOL 0.1-0.02MG
KIT ORAL
Qty: 84 TABLET | Refills: 1 | Status: SHIPPED | OUTPATIENT
Start: 2017-11-15 | End: 2018-04-19

## 2017-11-15 NOTE — TELEPHONE ENCOUNTER
Requested Prescriptions   Pending Prescriptions Disp Refills     LESSINA-28 0.1-20 MG-MCG per tablet [Pharmacy Med Name: ANDRE              TAB] 84 tablet 3     Sig: TAKE ONE TABLET BY MOUTH ONCE DAILY    Contraceptives Protocol Passed    11/15/2017  5:30 AM       Passed - Patient is not a current smoker if age is 35 or older       Passed - Recent or future visit with authorizing provider's specialty    Patient had office visit in the last year or has a visit in the next 30 days with authorizing provider.  See chart review.     Last ov 04/24/2017         Passed - No active pregnancy on record       Passed - No positive pregnancy test in past 12 months        Prescription approved per [facility] Refill Protocol.  Kelvin Monterroso, RN, BSN

## 2017-12-01 DIAGNOSIS — B36.0 TINEA VERSICOLOR: Primary | ICD-10-CM

## 2017-12-01 NOTE — TELEPHONE ENCOUNTER
Requested Prescriptions   Pending Prescriptions Disp Refills     ketoconazole (NIZORAL) 200 MG tablet [Pharmacy Med Name: KETOCONAZOLE 200MG  TAB] 10 tablet 2     Sig: TAKE ONE TABLET BY MOUTH ONCE DAILY FOR 7 DAYS ,  THEN  TAKE  ONE  TABLET  EITHER  ONCE  A  WEEK  OR  ONCE  A  MONTH  AS  NEEDED    There is no refill protocol information for this order        Last ov 04/24/2017    Routing refill request to provider for review/approval because:  Drug not on the [facility] refill protocol     Kelvin Monterroso, RN, BSN

## 2017-12-04 RX ORDER — KETOCONAZOLE 200 MG/1
TABLET ORAL
Qty: 10 TABLET | Refills: 0 | Status: SHIPPED | OUTPATIENT
Start: 2017-12-04 | End: 2017-12-11

## 2017-12-04 NOTE — TELEPHONE ENCOUNTER
Spoke with pt and she will do phone visit. On 12/11/17 with NP, provider or RN please sign/remove medication and close encounter.    Farideh Joseph CMA (AAMA)

## 2017-12-11 ENCOUNTER — VIRTUAL VISIT (OUTPATIENT)
Dept: FAMILY MEDICINE | Facility: OTHER | Age: 22
End: 2017-12-11
Payer: COMMERCIAL

## 2017-12-11 DIAGNOSIS — B36.0 TINEA VERSICOLOR: Primary | ICD-10-CM

## 2017-12-11 PROCEDURE — 98966 PH1 ASSMT&MGMT NQHP 5-10: CPT | Performed by: PHYSICIAN ASSISTANT

## 2017-12-11 RX ORDER — KETOCONAZOLE 200 MG/1
TABLET ORAL
Qty: 10 TABLET | Refills: 3 | Status: SHIPPED | OUTPATIENT
Start: 2017-12-11

## 2017-12-11 ASSESSMENT — ANXIETY QUESTIONNAIRES
3. WORRYING TOO MUCH ABOUT DIFFERENT THINGS: NOT AT ALL
GAD7 TOTAL SCORE: 0
7. FEELING AFRAID AS IF SOMETHING AWFUL MIGHT HAPPEN: NOT AT ALL
2. NOT BEING ABLE TO STOP OR CONTROL WORRYING: NOT AT ALL
5. BEING SO RESTLESS THAT IT IS HARD TO SIT STILL: NOT AT ALL
1. FEELING NERVOUS, ANXIOUS, OR ON EDGE: NOT AT ALL
6. BECOMING EASILY ANNOYED OR IRRITABLE: NOT AT ALL

## 2017-12-11 ASSESSMENT — PATIENT HEALTH QUESTIONNAIRE - PHQ9
5. POOR APPETITE OR OVEREATING: NOT AT ALL
SUM OF ALL RESPONSES TO PHQ QUESTIONS 1-9: 0

## 2017-12-11 ASSESSMENT — PAIN SCALES - GENERAL: PAINLEVEL: NO PAIN (0)

## 2017-12-11 NOTE — MR AVS SNAPSHOT
After Visit Summary   12/11/2017    Dominique Shah    MRN: [id number]           Patient Information     Date Of Birth          1995        Visit Information        Provider Department      12/11/2017 9:30 AM Fide Scruggs PA-C [facility]        Today's Diagnoses     Tinea versicolor    -  1       Follow-ups after your visit        Additional Services     DERMATOLOGY REFERRAL       Your provider has referred you to: [facility]: [facility] [phone]   [email or web address]    Please be aware that coverage of these services is subject to the terms and limitations of your health insurance plan.  Call member services at your health plan with any benefit or coverage questions.      Please bring the following with you to your appointment:    (1) Any X-Rays, CTs or MRIs which have been performed.  Contact the facility where they were done to arrange for  prior to your scheduled appointment.    (2) List of current medications  (3) This referral request   (4) Any documents/labs given to you for this referral                  Future tests that were ordered for you today     Open Future Orders        Priority Expected Expires Ordered    **Hepatic panel FUTURE 2mo Routine 2/9/2018 4/10/2018 12/11/2017            Who to contact     If you have questions or need follow up information about today's clinic visit or your schedule please contact [facility] directly at [phone].  Normal or non-critical lab and imaging results will be communicated to you by MyChart, letter or phone within 4 business days after the clinic has received the results. If you do not hear from us within 7 days, please contact the clinic through MyChart or phone. If you have a critical or abnormal lab result, we will notify you by phone as soon as possible.  Submit refill requests through [employer] or call your  pharmacy and they will forward the refill request to us. Please allow 3 business days for your refill to be completed.          Additional Information About Your Visit        [employer] Information     [employer] gives you secure access to your electronic health record. If you see a primary care provider, you can also send messages to your care team and make appointments. If you have questions, please call your primary care clinic.  If you do not have a primary care provider, please call [phone] and they will assist you.        Care EveryWhere ID     This is your Care EveryWhere ID. This could be used by other organizations to access your [place] medical records  [id number]         Blood Pressure from Last 3 Encounters:   07/18/17 118/74   04/24/17 118/70   12/15/16 122/66    Weight from Last 3 Encounters:   06/13/17 236 lb (107 kg)   04/24/17 245 lb (111.1 kg)   01/19/17 246 lb (111.6 kg)              We Performed the Following     DERMATOLOGY REFERRAL     NONPHYSICIAN TELEPHONE ASSESSMENT 5-10 MIN          Today's Medication Changes          These changes are accurate as of: 12/11/17 11:46 AM.  If you have any questions, ask your nurse or doctor.               These medicines have changed or have updated prescriptions.        Dose/Directions    ketoconazole 200 MG tablet   Commonly known as:  NIZORAL   This may have changed:  See the new instructions.   Used for:  Tinea versicolor   Changed by:  Fide Scruggs PA-C        TAKE ONE TABLET BY MOUTH ONCE DAILY FOR 7 DAYS ,  THEN  TAKE  ONE  TABLET  EITHER  ONCE  A  WEEK  OR  ONCE  A  MONTH  AS  NEEDED   Quantity:  10 tablet   Refills:  3            Where to get your medicines      These medications were sent to [facility] [address] [address] [place] [address]  [address] [address] [place] [address]     Phone:  [phone]     ketoconazole 200 MG tablet                Primary Care Provider Office Phone # Fax #    Fide Scruggs PA-C [phone]  [phone]       [address] [place] [address]        Equal Access to Services     [facility] Hospital[facility]OR[facility] : Hadii aad ku hadvegahéctor Dreaodell, waaxda lucliffadaha, qaybta willhenriettajefe currandavidjefe, salbador duranluisajustin oshea. So [facility] [phone].    ATENCIÓN: Si habla español, tiene a calderón disposición servicios gratuitos de asistencia lingüística. Lydiaame al [phone].    We comply with applicable federal civil rights laws and [place] laws. We do not discriminate on the basis of race, color, national origin, age, disability, sex, sexual orientation, or gender identity.            Thank you!     Thank you for choosing [facility] [place]  for your care. Our goal is always to provide you with excellent care. Hearing back from our patients is one way we can continue to improve our services. Please take a few minutes to complete the written survey that you may receive in the mail after your visit with us. Thank you!             Your Updated Medication List - Protect others around you: Learn how to safely use, store and throw away your medicines at www.disposemymeds.org.          This list is accurate as of: 12/11/17 11:46 AM.  Always use your most recent med list.                   Brand Name Dispense Instructions for use Diagnosis    EQ LORATADINE 10 MG tablet   Generic drug:  loratadine     90 tablet    TAKE ONE TABLET BY MOUTH ONCE DAILY    Allergic rhinitis, unspecified allergic rhinitis type       FLUoxetine 10 MG capsule    PROzac    90 capsule    TAKE THREE CAPSULES BY MOUTH DAILY    Major depression in complete remission (H)       IBUPROFEN PO      Take 200 mg by mouth every 6 hours Reported on 4/24/2017        ketoconazole 200 MG tablet    NIZORAL    10 tablet    TAKE ONE TABLET BY MOUTH ONCE DAILY FOR 7 DAYS ,  THEN  TAKE  ONE  TABLET  EITHER  ONCE  A  WEEK  OR  ONCE  A  MONTH  AS  NEEDED    Tinea versicolor       LESSINA-28 0.1-20 MG-MCG per tablet   Generic drug:  levonorgestrel-ethinyl estradiol      84 tablet    TAKE ONE TABLET BY MOUTH ONCE DAILY    Dysmenorrhea       rizatriptan 5 MG ODT tab    MAXALT-MLT    18 tablet    Take 1-2 tablets (5-10 mg) by mouth at onset of headache for migraine (May repeat in 2 hours as needed. Max 30 mg in 24 hours)    Migraine without aura and without status migrainosus, not intractable       topiramate 25 MG tablet    TOPAMAX    90 tablet    Take 25 mg at bedtime for one week, then 25 mg am and 25 mg pm for one week, then 25 mg am and 50 mg pm    Chronic migraine

## 2017-12-11 NOTE — PROGRESS NOTES
"Dominique Shah is a 22 year old female who is being evaluated via a billable telephone visit.      The patient has been notified of following:     \"This telephone visit will be conducted via a call between you and your physician/provider. We have found that certain health care needs can be provided without the need for a physical exam.  This service lets us provide the care you need with a short phone conversation.  If a prescription is necessary we can send it directly to your pharmacy.  If lab work is needed we can place an order for that and you can then stop by our lab to have the test done at a later time.    We will bill your insurance company for this service.  Please check with your medical insurance if this type of visit is covered. You may be responsible for the cost of this type of visit if insurance coverage is denied.  The typical cost is $30 (10min), $59 (11-20min) and $85 (21-30min).  Most often these visits are shorter than 10 minutes.    If during the course of the call the physician/provider feels a telephone visit is not appropriate, you will not be charged for this service.\"       Consent has been obtained for this service by care team member: yes. See the scanned image in the medical record.  SUBJECTIVE:     Dominique Shah complains of    Chief Complaint   Patient presents with     Recheck keoconazole       I have reviewed and updated the patient's Past Medical History, Social History, Family History and Medication List.    ALLERGIES  Oxycodone; Seasonal allergies; and Sulfa drugs    Therese Kapoor CMA   (MA signature)    Additional provider notes: Recheck on keoconazole, She has taken this intermittently over the years for tinea versicolor.  She believes she was first diagnosed with this at age 13. Seems to affect her in the spring and in the fall. She has tried treating with dandruff shampoo and showers rate after exercise but does not seem to be enough.She will take fluconazole " daily for 7 days and then weekly until it resolves. She has not had a hepatic panel done recently. She is also interested in dermatology referral to see if there is better treatment options    OBJECTIVE:     1. Tinea versicolor    - DERMATOLOGY REFERRAL  - ketoconazole (NIZORAL) 200 MG tablet; TAKE ONE TABLET BY MOUTH ONCE DAILY FOR 7 DAYS ,  THEN  TAKE  ONE  TABLET  EITHER  ONCE  A  WEEK  OR  ONCE  A  MONTH  AS  NEEDED  Dispense: 10 tablet; Refill: 3  - **Hepatic panel FUTURE 2mo; Future  - NONPHYSICIAN TELEPHONE ASSESSMENT 5-10 MIN    We will continue to treat her with Cheryl, as well as above and she'll follow up with derm for further recommendations    I have reviewed the note as documented above.  This accurately captures the substance of my conversation with the patient,  Electronically signed by Fide Scruggs PA-C     Total time of call between patient and provider was 5 minutes     Therese Kapoor, CMA

## 2017-12-12 ASSESSMENT — ANXIETY QUESTIONNAIRES: GAD7 TOTAL SCORE: 0

## 2017-12-27 ENCOUNTER — TELEPHONE (OUTPATIENT)
Dept: FAMILY MEDICINE | Facility: OTHER | Age: 22
End: 2017-12-27

## 2017-12-27 NOTE — TELEPHONE ENCOUNTER
You placed a referral for patient to dermatology on 12/11/17.  Patient has not scheduled as of yet.      Please review and forward to team if follow up with the patient is needed.     Thank you!  Blanca/Clinic Referrals Dyad II

## 2017-12-27 NOTE — LETTER
[address] [address]  [phone]          December 27, 2017    Dominique Shah                                                                                                                     [id number] [place] [id number]            Dear Dominique,    We received a notice that you are to be scheduled with a specialty clinic. The referral has been placed by your provider and you can call to schedule an appointment directly.     Enclosed, you will find the referral with the phone number to call to schedule an appointment.  If you have already scheduled this, you may disregard this letter.    Please call us if you have any questions or concerns.      Sincerely,         Fide Scruggs PA-C

## 2018-01-16 DIAGNOSIS — G44.209 TENSION HEADACHE: Primary | ICD-10-CM

## 2018-01-17 PROBLEM — G44.209 TENSION HEADACHE: Status: ACTIVE | Noted: 2018-01-17

## 2018-01-17 RX ORDER — CYCLOBENZAPRINE HCL 10 MG
TABLET ORAL
Qty: 30 TABLET | Refills: 0 | Status: SHIPPED | OUTPATIENT
Start: 2018-01-17

## 2018-01-17 NOTE — TELEPHONE ENCOUNTER
Requested Prescriptions   Pending Prescriptions Disp Refills     cyclobenzaprine (FLEXERIL) 10 MG tablet [Pharmacy Med Name: CYCLOBENZAPR 10MG   TAB] 30 tablet 1     Sig: TAKE ONE TABLET BY MOUTH THREE TIMES DAILY AS NEEDED FOR MUSCLE SPASM    There is no refill protocol information for this order        last ov 04/24/2017    Routing refill request to provider for review/approval because:  Drug not on the G refill protocol

## 2018-01-20 ENCOUNTER — HEALTH MAINTENANCE LETTER (OUTPATIENT)
Age: 23
End: 2018-01-20

## 2018-03-22 DIAGNOSIS — F32.5 MAJOR DEPRESSION IN COMPLETE REMISSION (H): ICD-10-CM

## 2018-03-22 NOTE — TELEPHONE ENCOUNTER
FLUoxetine (PROZAC) 10 MG capsule  PHQ-9 SCORE 12/15/2016 4/21/2017 12/11/2017   Total Score - - -   Total Score MyChart - 6 (Mild depression) -   Total Score 4 - 0     Routing refill request to provider for review/approval because:  A break in medication, should have needed refills 01/2018  Patient needs to be seen because:  Due for physical 04/2018    Next 5 appointments (look out 90 days)     Apr 04, 2018  3:00 PM CDT   [facility] Visit with Fide Scruggs PA-C   [facility]    [address] [facility] [address] [phone]              Malena Mijares RN, BSN

## 2018-03-26 RX ORDER — FLUOXETINE 10 MG/1
CAPSULE ORAL
Qty: 90 CAPSULE | Refills: 0 | Status: SHIPPED | OUTPATIENT
Start: 2018-03-26 | End: 2018-04-19

## 2018-03-28 NOTE — PROGRESS NOTES
"  SUBJECTIVE:   Dominique Shah is a 22 year old female who presents to clinic today for the following health issues:      HPI  Patient is here for a routine Pap.   She had an ASCUS Pap with positive HPV in 2015.  She is due for her next Pap smear.  She is having no issues.  Pt just started with her period today.      Problem list and histories reviewed & adjusted, as indicated.  Additional history: as documented    BP Readings from Last 3 Encounters:   04/04/18 118/68   07/18/17 118/74   04/24/17 118/70    Wt Readings from Last 3 Encounters:   04/04/18 258 lb (117 kg)   06/13/17 236 lb (107 kg)   04/24/17 245 lb (111.1 kg)                  Labs reviewed in EPIC    ROS:  INTEGUMENTARY/SKIN: she is still having issues with tinea versicolor, she uses oral ketoconazole episodically    OBJECTIVE:     /68  Pulse 70  Temp 98.2  F (36.8  C) (Temporal)  Resp 16  Ht 5' 6\" (1.676 m)  Wt 258 lb (117 kg)  LMP 04/04/2018  SpO2 97%  BMI 41.64 kg/m2  Body mass index is 41.64 kg/(m^2).  GENERAL: healthy, alert and no distress   (female): normal female external genitalia, normal urethral meatus, vaginal mucosa, normal cervix/adnexa/uterus without masses or discharge, she is currently menstruating  SKIN: Subtle phone colored and hypopigmented lesions on upper back    Diagnostic Test Results:  No results found for this or any previous visit (from the past 24 hour(s)).    ASSESSMENT/PLAN:         1. Papanicolaou smear of cervix with atypical squamous cells of undetermined significance (ASC-US)  pending  - Pap imaged thin layer diagnostic with HPV (select HPV order below)  - HPV High Risk Types DNA Cervical    2. High risk human papillomavirus infection    - Pap imaged thin layer diagnostic with HPV (select HPV order below)  - HPV High Risk Types DNA Cervical    3. Screen for STD (sexually transmitted disease)    - Chlamydia trachomatis PCR    4. Tinea versicolor  May continue with episodic use of ketoconazole  - " **Hepatic panel FUTURE 2mo        Fide Scruggs PA-C  [facility]  Electronically signed by Fide Scruggs PA-C

## 2018-04-04 ENCOUNTER — OFFICE VISIT (OUTPATIENT)
Dept: FAMILY MEDICINE | Facility: OTHER | Age: 23
End: 2018-04-04
Payer: COMMERCIAL

## 2018-04-04 VITALS
HEART RATE: 70 BPM | BODY MASS INDEX: 41.46 KG/M2 | TEMPERATURE: 98.2 F | HEIGHT: 66 IN | OXYGEN SATURATION: 97 % | WEIGHT: 258 LBS | SYSTOLIC BLOOD PRESSURE: 118 MMHG | DIASTOLIC BLOOD PRESSURE: 68 MMHG | RESPIRATION RATE: 16 BRPM

## 2018-04-04 DIAGNOSIS — R87.610 PAPANICOLAOU SMEAR OF CERVIX WITH ATYPICAL SQUAMOUS CELLS OF UNDETERMINED SIGNIFICANCE (ASC-US): Primary | ICD-10-CM

## 2018-04-04 DIAGNOSIS — B36.0 TINEA VERSICOLOR: ICD-10-CM

## 2018-04-04 DIAGNOSIS — B97.7 HIGH RISK HUMAN PAPILLOMAVIRUS INFECTION: ICD-10-CM

## 2018-04-04 DIAGNOSIS — Z11.3 SCREEN FOR STD (SEXUALLY TRANSMITTED DISEASE): ICD-10-CM

## 2018-04-04 LAB
ALBUMIN SERPL-MCNC: 3.4 G/DL (ref 3.4–5)
ALP SERPL-CCNC: 91 U/L (ref 40–150)
ALT SERPL W P-5'-P-CCNC: 18 U/L (ref 0–50)
AST SERPL W P-5'-P-CCNC: 22 U/L (ref 0–45)
BILIRUB DIRECT SERPL-MCNC: <0.1 MG/DL (ref 0–0.2)
BILIRUB SERPL-MCNC: 0.2 MG/DL (ref 0.2–1.3)
PROT SERPL-MCNC: 7.6 G/DL (ref 6.8–8.8)

## 2018-04-04 PROCEDURE — 87624 HPV HI-RISK TYP POOLED RSLT: CPT | Performed by: PHYSICIAN ASSISTANT

## 2018-04-04 PROCEDURE — 80076 HEPATIC FUNCTION PANEL: CPT | Performed by: PHYSICIAN ASSISTANT

## 2018-04-04 PROCEDURE — 99212 OFFICE O/P EST SF 10 MIN: CPT | Performed by: PHYSICIAN ASSISTANT

## 2018-04-04 PROCEDURE — 88175 CYTOPATH C/V AUTO FLUID REDO: CPT | Performed by: PHYSICIAN ASSISTANT

## 2018-04-04 PROCEDURE — 36415 COLL VENOUS BLD VENIPUNCTURE: CPT | Performed by: PHYSICIAN ASSISTANT

## 2018-04-04 PROCEDURE — 87491 CHLMYD TRACH DNA AMP PROBE: CPT | Performed by: PHYSICIAN ASSISTANT

## 2018-04-04 ASSESSMENT — ANXIETY QUESTIONNAIRES
4. TROUBLE RELAXING: NOT AT ALL
7. FEELING AFRAID AS IF SOMETHING AWFUL MIGHT HAPPEN: NOT AT ALL
GAD7 TOTAL SCORE: 1
6. BECOMING EASILY ANNOYED OR IRRITABLE: SEVERAL DAYS
2. NOT BEING ABLE TO STOP OR CONTROL WORRYING: NOT AT ALL
5. BEING SO RESTLESS THAT IT IS HARD TO SIT STILL: NOT AT ALL
GAD7 TOTAL SCORE: 1
7. FEELING AFRAID AS IF SOMETHING AWFUL MIGHT HAPPEN: NOT AT ALL
3. WORRYING TOO MUCH ABOUT DIFFERENT THINGS: NOT AT ALL
1. FEELING NERVOUS, ANXIOUS, OR ON EDGE: NOT AT ALL

## 2018-04-04 ASSESSMENT — PATIENT HEALTH QUESTIONNAIRE - PHQ9
SUM OF ALL RESPONSES TO PHQ QUESTIONS 1-9: 11
SUM OF ALL RESPONSES TO PHQ QUESTIONS 1-9: 11
10. IF YOU CHECKED OFF ANY PROBLEMS, HOW DIFFICULT HAVE THESE PROBLEMS MADE IT FOR YOU TO DO YOUR WORK, TAKE CARE OF THINGS AT HOME, OR GET ALONG WITH OTHER PEOPLE: SOMEWHAT DIFFICULT

## 2018-04-04 ASSESSMENT — PAIN SCALES - GENERAL: PAINLEVEL: NO PAIN (0)

## 2018-04-04 NOTE — LETTER
My Depression Action Plan  Name: Dominique Shah   Date of Birth 1995  Date: 3/28/2018    My doctor: Fide Scruggs   My clinic: [address] [address] [phone]          GREEN    ZONE   Good Control    What it looks like:     Things are going generally well. You have normal up s and down s. You may even feel depressed from time to time, but bad moods usually last less than a day.   What you need to do:  1. Continue to care for yourself (see self care plan)  2. Check your depression survival kit and update it as needed  3. Follow your physician s recommendations including any medication.  4. Do not stop taking medication unless you consult with your physician first.           YELLOW         ZONE Getting Worse    What it looks like:     Depression is starting to interfere with your life.     It may be hard to get out of bed; you may be starting to isolate yourself from others.    Symptoms of depression are starting to last most all day and this has happened for several days.     You may have suicidal thoughts but they are not constant.   What you need to do:     1. Call your care team, your response to treatment will improve if you keep your care team informed of your progress. Yellow periods are signs an adjustment may need to be made.     2. Continue your self-care, even if you have to fake it!    3. Talk to someone in your support network    4. Open up your depression survival kit           RED    ZONE Medical Alert - Get Help    What it looks like:     Depression is seriously interfering with your life.     You may experience these or other symptoms: You can t get out of bed most days, can t work or engage in other necessary activities, you have trouble taking care of basic hygiene, or basic responsibilities, thoughts of suicide or death that will not go away, self-injurious behavior.     What you need to do:  1. Call your care team and  request a same-day appointment. If they are not available (weekends or after hours) call your local crisis line, emergency room or 911.            Depression Self Care Plan / Survival Kit    Self-Care for Depression  Here s the deal. Your body and mind are really not as separate as most people think.  What you do and think affects how you feel and how you feel influences what you do and think. This means if you do things that people who feel good do, it will help you feel better.  Sometimes this is all it takes.  There is also a place for medication and therapy depending on how severe your depression is, so be sure to consult with your medical provider and/ or Behavioral Health Consultant if your symptoms are worsening or not improving.     In order to better manage my stress, I will:    Exercise  Get some form of exercise, every day. This will help reduce pain and release endorphins, the  feel good  chemicals in your brain. This is almost as good as taking antidepressants!  This is not the same as joining a gym and then never going! (they count on that by the way ) It can be as simple as just going for a walk or doing some gardening, anything that will get you moving.      Hygiene   Maintain good hygiene (Get out of bed in the morning, Make your bed, Brush your teeth, Take a shower, and Get dressed like you were going to work, even if you are unemployed).  If your clothes don't fit try to get ones that do.    Diet  I will strive to eat foods that are good for me, drink plenty of water, and avoid excessive sugar, caffeine, alcohol, and other mood-altering substances.  Some foods that are helpful in depression are: complex carbohydrates, B vitamins, flaxseed, fish or fish oil, fresh fruits and vegetables.    Psychotherapy  I agree to participate in Individual Therapy (if recommended).    Medication  If prescribed medications, I agree to take them.  Missing doses can result in serious side effects.  I understand that  drinking alcohol, or other illicit drug use, may cause potential side effects.  I will not stop my medication abruptly without first discussing it with my provider.    Staying Connected With Others  I will stay in touch with my friends, family members, and my primary care provider/team.    Use your imagination  Be creative.  We all have a creative side; it doesn t matter if it s oil painting, sand castles, or mud pies! This will also kick up the endorphins.    Witness Beauty  (AKA stop and smell the roses) Take a look outside, even in mid-winter. Notice colors, textures. Watch the squirrels and birds.     Service to others  Be of service to others.  There is always someone else in need.  By helping others we can  get out of ourselves  and remember the really important things.  This also provides opportunities for practicing all the other parts of the program.    Humor  Laugh and be silly!  Adjust your TV habits for less news and crime-drama and more comedy.    Control your stress  Try breathing deep, massage therapy, biofeedback, and meditation. Find time to relax each day.     My support system    Clinic Contact:  Phone number:    Contact 1:  Phone number:    Contact 2:  Phone number:    [employer]/:  Phone number:    Therapist:  Phone number:    Local crisis center:    Phone number:    Other community support:  Phone number:

## 2018-04-04 NOTE — MR AVS SNAPSHOT
After Visit Summary   4/4/2018    Dominique Shah    MRN: [id number]           Patient Information     Date Of Birth          1995        Visit Information        Provider Department      4/4/2018 3:00 PM Fide Scruggs PA-C [facility]        Today's Diagnoses     Papanicolaou smear of cervix with atypical squamous cells of undetermined significance (ASC-US)    -  1    High risk human papillomavirus infection        Screen for STD (sexually transmitted disease)           Follow-ups after your visit        Your next 10 appointments already scheduled     Apr 04, 2018  3:00 PM CDT   Office Visit with Fide Scruggs PA-C   [facility]    [address] [facility] [address] [phone]           Bring a current list of meds and any records pertaining to this visit. For Physicals, please bring immunization records and any forms needing to be filled out. Please arrive 10 minutes early to complete paperwork.              Who to contact     If you have questions or need follow up information about today's clinic visit or your schedule please contact [facility] directly at [phone].  Normal or non-critical lab and imaging results will be communicated to you by MyChart, letter or phone within 4 business days after the clinic has received the results. If you do not hear from us within 7 days, please contact the clinic through [employer] or phone. If you have a critical or abnormal lab result, we will notify you by phone as soon as possible.  Submit refill requests through [employer] or call your pharmacy and they will forward the refill request to us. Please allow 3 business days for your refill to be completed.          Additional Information About Your Visit        MyChart Information     [employer] gives you secure access to your electronic health record. If you see a primary care provider, you can also send messages  "to your care team and make appointments. If you have questions, please call your primary care clinic.  If you do not have a primary care provider, please call [phone] and they will assist you.        Care EveryWhere ID     This is your Care EveryWhere ID. This could be used by other organizations to access your [place] [facility] medical records  [id number]        Your Vitals Were     Pulse Temperature Respirations Height Last Period Pulse Oximetry    70 98.2  F (36.8  C) (Temporal) 16 5' 6\" (1.676 m) 03/07/2018 97%    BMI (Body Mass Index)                   41.64 kg/m2            Blood Pressure from Last 3 Encounters:   04/04/18 118/68   07/18/17 118/74   04/24/17 118/70    Weight from Last 3 Encounters:   04/04/18 258 lb (117 kg)   06/13/17 236 lb (107 kg)   04/24/17 245 lb (111.1 kg)              We Performed the Following     Chlamydia trachomatis PCR     DEPRESSION ACTION PLAN (DAP)     HPV High Risk Types DNA Cervical     Pap imaged thin layer diagnostic with HPV (select HPV order below)        Primary Care Provider Office Phone # Fax #    Fide Scruggs PA-C [phone] [address] [place] [address]        Equal Access to Services     ERIN SOTOMAYOR : Hadii yolanda ku hadasho Soomaali, waaxda luqadaha, qaybta kaalmada adeegyada, waxay antonio oshea. So [facility] [phone].    ATENCIÓN: Si habla español, tiene a calderón disposición servicios gratuitos de asistencia lingüística. Llame al [phone].    We comply with applicable federal civil rights laws and [place] laws. We do not discriminate on the basis of race, color, national origin, age, disability, sex, sexual orientation, or gender identity.            Thank you!     Thank you for choosing [facility]  for your care. Our goal is always to provide you with excellent care. Hearing back from our patients is one way we can continue to improve our services. Please take a few minutes to complete the written " survey that you may receive in the mail after your visit with us. Thank you!             Your Updated Medication List - Protect others around you: Learn how to safely use, store and throw away your medicines at www.disposemymeds.org.          This list is accurate as of 4/4/18  2:58 PM.  Always use your most recent med list.                   Brand Name Dispense Instructions for use Diagnosis    cyclobenzaprine 10 MG tablet    FLEXERIL    30 tablet    TAKE ONE TABLET BY MOUTH THREE TIMES DAILY AS NEEDED FOR MUSCLE SPASM    Tension headache       EQ LORATADINE 10 MG tablet   Generic drug:  loratadine     90 tablet    TAKE ONE TABLET BY MOUTH ONCE DAILY    Allergic rhinitis, unspecified allergic rhinitis type       FLUoxetine 10 MG capsule    PROzac    90 capsule    TAKE THREE CAPSULES BY MOUTH DAILY    Major depression in complete remission (H)       IBUPROFEN PO      Take 200 mg by mouth every 6 hours Reported on 4/24/2017        ketoconazole 200 MG tablet    NIZORAL    10 tablet    TAKE ONE TABLET BY MOUTH ONCE DAILY FOR 7 DAYS ,  THEN  TAKE  ONE  TABLET  EITHER  ONCE  A  WEEK  OR  ONCE  A  MONTH  AS  NEEDED    Tinea versicolor       LESSINA-28 0.1-20 MG-MCG per tablet   Generic drug:  levonorgestrel-ethinyl estradiol     84 tablet    TAKE ONE TABLET BY MOUTH ONCE DAILY    Dysmenorrhea       rizatriptan 5 MG ODT tab    MAXALT-MLT    18 tablet    Take 1-2 tablets (5-10 mg) by mouth at onset of headache for migraine (May repeat in 2 hours as needed. Max 30 mg in 24 hours)    Migraine without aura and without status migrainosus, not intractable       topiramate 25 MG tablet    TOPAMAX    90 tablet    Take 25 mg at bedtime for one week, then 25 mg am and 25 mg pm for one week, then 25 mg am and 50 mg pm    Chronic migraine

## 2018-04-05 LAB
C TRACH DNA SPEC QL NAA+PROBE: NEGATIVE
SPECIMEN SOURCE: NORMAL

## 2018-04-05 ASSESSMENT — ANXIETY QUESTIONNAIRES: GAD7 TOTAL SCORE: 1

## 2018-04-05 ASSESSMENT — PATIENT HEALTH QUESTIONNAIRE - PHQ9: SUM OF ALL RESPONSES TO PHQ QUESTIONS 1-9: 11

## 2018-04-09 LAB
COPATH REPORT: NORMAL
PAP: NORMAL

## 2018-04-10 LAB
FINAL DIAGNOSIS: NORMAL
HPV HR 12 DNA CVX QL NAA+PROBE: NEGATIVE
HPV16 DNA SPEC QL NAA+PROBE: NEGATIVE
HPV18 DNA SPEC QL NAA+PROBE: NEGATIVE
SPECIMEN DESCRIPTION: NORMAL
SPECIMEN SOURCE CVX/VAG CYTO: NORMAL

## 2018-04-19 DIAGNOSIS — F32.5 MAJOR DEPRESSION IN COMPLETE REMISSION (H): ICD-10-CM

## 2018-04-19 DIAGNOSIS — N94.6 DYSMENORRHEA: ICD-10-CM

## 2018-04-19 NOTE — TELEPHONE ENCOUNTER
BCP:  Routing refill request to provider for review/approval because:  Drug interaction warning    Prozac:  Routing refill request to provider for review/approval because:  Pharmacy requesting 90 day supplies.    Dottie Rodriguez RN

## 2018-04-20 RX ORDER — LEVONORGESTREL AND ETHINYL ESTRADIOL 0.1-0.02MG
KIT ORAL
Qty: 84 TABLET | Refills: 3 | Status: SHIPPED | OUTPATIENT
Start: 2018-04-20

## 2018-04-20 RX ORDER — FLUOXETINE 10 MG/1
CAPSULE ORAL
Qty: 270 CAPSULE | Refills: 1 | Status: SHIPPED | OUTPATIENT
Start: 2018-04-20

## 2018-04-20 NOTE — TELEPHONE ENCOUNTER
meds approved, please call pt- she is likely aware of this interaction between topiramate and her bcp but just incase let her know she should always use a condom since these meds together make her bcp potentially not as effective and it may be possible to become pregnant  Fide Scruggs PA-C

## 2018-04-20 NOTE — TELEPHONE ENCOUNTER
Spoke to patient informed her of message below. Patient stated she does not take topiramate any more.   Cyndi Baron CMA (MA)

## 2018-07-30 ENCOUNTER — MYC MEDICAL ADVICE (OUTPATIENT)
Dept: FAMILY MEDICINE | Facility: OTHER | Age: 23
End: 2018-07-30

## 2018-07-30 NOTE — TELEPHONE ENCOUNTER
Our goal is to have forms completed with 72 hours, however some forms may require a visit or additional information.    Who is the form from?: Patient  Where the form came from: Printed from My chart  What clinic location was the form placed at?: [facility]  Where the form was placed: 's Box  What number is listed as a contact on the form?: NA    Phone call message- patient request for a letter, form or note:    Date needed: as soon as possible  Please call the patient when completed  Has the patient signed a consent form for release of information? Not Applicable    Additional comments:     Call taken on 7/30/2018 at 4:30 PM by Cyndi Baron    Type of letter, form or note: school

## 2018-07-31 NOTE — TELEPHONE ENCOUNTER
"Spoke with patient, informed that requested paperwork was completed by provider. No fax number on paperwork. Dominique said she will get the number and call us back. Forms placed back in provider's \"MA to do\" box.  Jennifer FAULKNER CMA ([facility])  "

## 2018-08-01 NOTE — TELEPHONE ENCOUNTER
LM for pt to return call, when call is returned please ask if she is picking up form, want us to mail it or fax this form. If faxing please have pt give number to fax as there is not want on the form.    Farideh Joseph CMA ([facility])

## 2018-08-02 NOTE — TELEPHONE ENCOUNTER
Spoke with patient she asked for form to be [facility] [phone] [address] [facility] [address]  Sent to scanning and copy placed in [employer] fax folder  Closing encounter  Roma Perez RT (R)

## 2019-10-25 ENCOUNTER — PRENATAL OFFICE VISIT (OUTPATIENT)
Dept: OBGYN | Facility: CLINIC | Age: 24
End: 2019-10-25
Payer: COMMERCIAL

## 2019-10-25 VITALS
HEART RATE: 88 BPM | BODY MASS INDEX: 41.71 KG/M2 | SYSTOLIC BLOOD PRESSURE: 116 MMHG | WEIGHT: 258.4 LBS | DIASTOLIC BLOOD PRESSURE: 82 MMHG

## 2019-10-25 DIAGNOSIS — E66.01 MORBID OBESITY (H): ICD-10-CM

## 2019-10-25 DIAGNOSIS — Z23 NEED FOR PROPHYLACTIC VACCINATION AND INOCULATION AGAINST INFLUENZA: ICD-10-CM

## 2019-10-25 PROCEDURE — 86481 TB AG RESPONSE T-CELL SUSP: CPT | Performed by: OBSTETRICS & GYNECOLOGY

## 2019-10-25 PROCEDURE — 36415 COLL VENOUS BLD VENIPUNCTURE: CPT | Performed by: OBSTETRICS & GYNECOLOGY

## 2019-10-25 PROCEDURE — 90686 IIV4 VACC NO PRSV 0.5 ML IM: CPT | Performed by: OBSTETRICS & GYNECOLOGY

## 2019-10-25 PROCEDURE — 90471 IMMUNIZATION ADMIN: CPT | Performed by: OBSTETRICS & GYNECOLOGY

## 2019-10-25 RX ORDER — KETOCONAZOLE 200 MG/1
200 TABLET ORAL DAILY
Qty: 7 TABLET | Refills: 3 | Status: SHIPPED | OUTPATIENT
Start: 2019-10-25 | End: 2019-11-01

## 2019-10-25 RX ORDER — LEVONORGESTREL/ETHIN.ESTRADIOL 0.1-0.02MG
1 TABLET ORAL DAILY
Qty: 90 TABLET | Refills: 11 | Status: SHIPPED | OUTPATIENT
Start: 2019-10-25

## 2019-10-25 RX ORDER — PRENATAL VIT/IRON FUM/FOLIC AC 27MG-0.8MG
1 TABLET ORAL DAILY
COMMUNITY

## 2019-10-25 NOTE — PROGRESS NOTES
CC: 6 week post partum follow up     HPI: Dominique Shah is a 23 year old female who presents today for 6 week post partum follow up. Patient is a new patient to [facility] as insurances switched. This was patients first pregnancy and birth and things are going well. Delivery went well on 9/15/19. Patient had Grade 2 vaginal tearing requiring stitches. Patient states that at first she was tired due to waking up every hour with the baby but baby is now sleeping 4-5 hours at a time and this helps her get more sleep. She also has help at home with her  and grandparents helping to take care of baby. Patient states that she has a history of depression, but is not suffering from any post partum depression at this time. She says that she is having positive mental thoughts about herself and baby. Patient is currently breastfeeding and not having any breast pain, changes. Patient had some bleeding after child birth but has not had any since and has not gotten her menses. Patient has not had sexual intercourse since delivery. Patient is interested in restarting birth control. Patient states that she was previously on Avianne OCP and had no issues with this. She states that her and her  plan on trying for pregnancy again in 1-2 years. Patient endorses pelvic pain while sleeping and legs are together. She states that she sleeps with a pillow between her legs and this decreases pain. She states the pain is on the outside of her vagina in a bikini line pattern.     Patient also endorses a rash on back, neck, chest that has been present on and off for several years. Patient states this is a known fungal infection for which she takes oral ketoconazole as needed.     Patient also requests 2 notes for work, Influenza vaccine, TB test for school.     Gyn history:   Last Pap/HPV reflex: 17 - ASC-US, HPV negative - repeat in 3 years     Ob history:       PAST MEDICAL HISTORY:   Past Medical History:    Diagnosis Date     ASCUS with positive high risk HPV 05/28/2015    @ 19 y.o.     Conductive hearing loss 6 months age    Starting to notice a lot more the past few months     Depressive disorder      Hearing problem      Infectious mononucleosis     Age 7     Migraines Always have    Mild headaches are very common. 4-5 x a week     Rubella without mention of complication     Rubella     Tinnitus July 2016    Hear heart beat in my left ear. Constantly. Loud.       PAST SURGICAL HISTORY:   Past Surgical History:   Procedure Laterality Date     ARTHROSCOPY SHOULDER CAPSULAR REPAIR  9/25/2013    Procedure: ARTHROSCOPY SHOULDER CAPSULAR REPAIR;  Right Shoulder Arthroscopy with  labral repair,   ;  Surgeon: Jay Jay Avila MD;  Location: [facility]     ESOPHAGOSCOPY, GASTROSCOPY, DUODENOSCOPY (EGD), COMBINED  5/9/2013    Procedure: COMBINED ESOPHAGOSCOPY, GASTROSCOPY, DUODENOSCOPY (EGD), BIOPSY SINGLE OR MULTIPLE;  esophagoscopy, gastroscopy, duodenoscopy EGD  with  multiple biopsies;  Surgeon: Lawrence Friedman MD;  Location: [facility]     LAPAROSCOPIC CHOLECYSTECTOMY  5/23/2013    Procedure: LAPAROSCOPIC CHOLECYSTECTOMY;  Laparoscopic Cholecystectomy;  Surgeon: Lawrence Friedman MD;  Location: [facility]     TRANSPOSITION ULNAR NERVE (ELBOW)  7/30/2014    Procedure: TRANSPOSITION ULNAR NERVE (ELBOW);  Surgeon: Jay Jay Avila MD;  Location: [facility]       FAMILY HISTORY:   Family History   Problem Relation Age of Onset     Diabetes Maternal Grandfather         AOD     Heart Disease Maternal Grandfather         MI     Obesity Maternal Grandfather      Hypertension Mother      Hypertension Maternal Grandmother      Cerebrovascular Disease Maternal Grandmother      Unknown/Adopted No family hx of      Asthma No family hx of      Hypertension No family hx of      Cerebrovascular Disease No family hx of      Breast Cancer No family hx of      Cancer - colorectal No family hx of      Prostate Cancer No family hx of       Alcohol/Drug No family hx of      Allergies No family hx of      Alzheimer Disease No family hx of      Anesthesia Reaction No family hx of      Arthritis No family hx of      Blood Disease No family hx of      Cancer No family hx of      Cardiovascular No family hx of      Circulatory No family hx of      Congenital Anomalies No family hx of      Connective Tissue Disorder No family hx of      Depression No family hx of      Eye Disorder No family hx of      Genetic Disorder No family hx of      Gastrointestinal Disease No family hx of      Genitourinary Problems No family hx of      Gynecology No family hx of      Lipids No family hx of      Musculoskeletal Disorder No family hx of      Neurologic Disorder No family hx of      Osteoporosis No family hx of      Psychotic Disorder No family hx of      Respiratory No family hx of      Thyroid Disease No family hx of      Hearing Loss No family hx of        SOCIAL HISTORY:   Social History     Tobacco Use     Smoking status: Never Smoker     Smokeless tobacco: Never Used   Substance Use Topics     Alcohol use: Not Currently     Comment: 2-3times a month, if even     ROS: 10 point ROS negative aside from HPI.     Exam:  General: NAD, well appearing  Resp: No increased WOB  Abd: soft, non distended, non tender  Ext: Normal ROM, gait  : External genitalia without erythema, masses, lesions. Non tender to palpation on external genitalia. Vaginal introitus with erythema on inferior portion. Pain with palpation to inferior portion of vaginal canal and right side.     Assessment and Plan:  Dominique Shah is a 23 year old female who presents for 6 week post partum visit. Patient denies post partum depression, breast changes. Patient endorses pelvic pain that is consistent with pain from vaginal tearing/stitches during delivery. External pelvic pain during sleep is likely referred pain from tear. This still needs time to heal and will likely improve over time. Patient  is interested in restarting OCP.     1. 6 week post partum follow up    Healing well, pelvic pain likely secondary to vaginal tear during delivery and will heal over time.     2. Need for birth control     Restart Avianne     3. Tinea versicolor     Oral ketoconazole tablets ordered    4. Need for influenza vaccination    Flu shot today    5. Need for TB test/screening (school related)     Quantiferon TB test ordered today    Farzad Munoz MD  OB/GYN  October 25, 2019, 2:23 PM

## 2019-10-25 NOTE — LETTER
[address] [address]  [phone]          October 25, 2019    RE:  Dominique Shah                                                                                                                                                       [id number] [address] [id number]            To whom it may concern:    Dominique Shah is under my professional care for post-partum care after vaginal delivery on 9/15/19. She may return to work with the following: The employee is ABLE to return to work today.    When the patient returns to work, no restrictions apply.       Sincerely,        Farzad Munoz MD

## 2019-10-28 LAB
GAMMA INTERFERON BACKGROUND BLD IA-ACNC: 0.12 IU/ML
M TB IFN-G BLD-IMP: NEGATIVE
M TB IFN-G CD4+ BCKGRND COR BLD-ACNC: >10 IU/ML
MITOGEN IGNF BCKGRD COR BLD-ACNC: 0 IU/ML
MITOGEN IGNF BCKGRD COR BLD-ACNC: 0.07 IU/ML

## 2019-11-06 ENCOUNTER — HEALTH MAINTENANCE LETTER (OUTPATIENT)
Age: 24
End: 2019-11-06

## 2020-11-29 ENCOUNTER — HEALTH MAINTENANCE LETTER (OUTPATIENT)
Age: 25
End: 2020-11-29

## 2021-09-19 ENCOUNTER — HEALTH MAINTENANCE LETTER (OUTPATIENT)
Age: 26
End: 2021-09-19

## 2022-01-09 ENCOUNTER — HEALTH MAINTENANCE LETTER (OUTPATIENT)
Age: 27
End: 2022-01-09

## 2022-11-21 ENCOUNTER — HEALTH MAINTENANCE LETTER (OUTPATIENT)
Age: 27
End: 2022-11-21

## 2023-04-16 ENCOUNTER — HEALTH MAINTENANCE LETTER (OUTPATIENT)
Age: 28
End: 2023-04-16